# Patient Record
Sex: FEMALE | Race: WHITE | HISPANIC OR LATINO
[De-identification: names, ages, dates, MRNs, and addresses within clinical notes are randomized per-mention and may not be internally consistent; named-entity substitution may affect disease eponyms.]

---

## 2017-01-20 ENCOUNTER — LAB SERVICES (OUTPATIENT)
Dept: OTHER | Age: 37
End: 2017-01-20

## 2017-01-20 ENCOUNTER — CHARTING TRANS (OUTPATIENT)
Dept: OTHER | Age: 37
End: 2017-01-20

## 2017-01-20 LAB — RAPID STREP GROUP A: NORMAL

## 2017-04-25 ENCOUNTER — HOSPITAL ENCOUNTER (OUTPATIENT)
Age: 37
Discharge: HOME | End: 2017-04-25
Attending: FAMILY MEDICINE
Payer: COMMERCIAL

## 2017-04-25 VITALS
SYSTOLIC BLOOD PRESSURE: 142 MMHG | HEART RATE: 79 BPM | OXYGEN SATURATION: 98 % | RESPIRATION RATE: 18 BRPM | DIASTOLIC BLOOD PRESSURE: 97 MMHG | TEMPERATURE: 98 F

## 2017-04-25 DIAGNOSIS — R42 DIZZINESS: Primary | ICD-10-CM

## 2017-04-25 DIAGNOSIS — R11.0 NAUSEA: ICD-10-CM

## 2017-04-25 PROCEDURE — 99213 OFFICE O/P EST LOW 20 MIN: CPT

## 2017-04-25 PROCEDURE — 80076 HEPATIC FUNCTION PANEL: CPT | Performed by: FAMILY MEDICINE

## 2017-04-25 PROCEDURE — 85025 COMPLETE CBC W/AUTO DIFF WBC: CPT | Performed by: FAMILY MEDICINE

## 2017-04-25 PROCEDURE — 80047 BASIC METABLC PNL IONIZED CA: CPT

## 2017-04-25 PROCEDURE — 99214 OFFICE O/P EST MOD 30 MIN: CPT

## 2017-04-25 RX ORDER — MECLIZINE HYDROCHLORIDE 25 MG/1
25 TABLET ORAL 3 TIMES DAILY PRN
Qty: 21 TABLET | Refills: 0 | Status: SHIPPED | OUTPATIENT
Start: 2017-04-25 | End: 2017-07-31

## 2017-04-25 RX ORDER — ONDANSETRON 4 MG/1
TABLET, ORALLY DISINTEGRATING ORAL
Qty: 12 TABLET | Refills: 0 | Status: SHIPPED | OUTPATIENT
Start: 2017-04-25 | End: 2017-07-31

## 2017-04-25 RX ORDER — ONDANSETRON 4 MG/1
8 TABLET, ORALLY DISINTEGRATING ORAL ONCE
Status: COMPLETED | OUTPATIENT
Start: 2017-04-25 | End: 2017-04-25

## 2017-04-25 RX ORDER — MECLIZINE HCL 12.5 MG/1
25 TABLET ORAL ONCE
Status: COMPLETED | OUTPATIENT
Start: 2017-04-25 | End: 2017-04-25

## 2017-04-25 NOTE — ED PROVIDER NOTES
Patient Seen in: [facility] In [facility]    History   Patient presents with:  Nausea    Stated Complaint: nausea & dizzy    HPI    This 41-year-old female presents to the office with complaint of nausea and dizziness after she was cleaning u Oral Tab,  Take  by mouth. MethylPREDNISolone 4 MG Oral Tablet Therapy Pack,  Dispense as dose fauzia   Sertraline HCl (ZOLOFT) 50 MG Oral Tab,  Take 75 mg by mouth daily. 75 mg for 3 weeks  Then 100 mg daily for 1 week.        Family History   Problem Relat quadrants. EXTREMITIES: No clubbing, cyanosis, edema noted. SKIN: Warm and dry. No rash is noted. NEURO: No focal deficits.         ED Course     Labs Reviewed   POCT CBC - Abnormal; Notable for the following:     # Neutrophil 7.5 (*)     All other comp 4 mg ODT 1 every 6 hours as needed for nausea or vomiting. You may take Antivert 25 mg 1 tablet every 6 hours as needed for dizziness. Avoid any spicy, greasy, fatty foods. Eat small frequent meals throughout the day. Drinks 64 ounces of water daily.

## 2017-04-25 NOTE — ED INITIAL ASSESSMENT (HPI)
Picked up little tubes of paint on the floor and felt nauseated and also felt dizzy after. States has a metallic taste in the mouth and some shortness of breath. Both arms feels warm.

## 2017-04-25 NOTE — ED NOTES
Poison Control called. Gaurav's photo oil paint reviewed by Haydee Mclaughlin - No hazard found. No respiratory precautions recommended. Haydee Mclaughlin recommends observe pt, he will call back a bit later. Dr. Tana Canada notified.

## 2017-07-31 ENCOUNTER — HOSPITAL ENCOUNTER (OUTPATIENT)
Age: 37
Discharge: HOME | End: 2017-07-31
Payer: MEDICAID

## 2017-07-31 ENCOUNTER — APPOINTMENT (OUTPATIENT)
Dept: GENERAL RADIOLOGY | Age: 37
End: 2017-07-31
Attending: PHYSICIAN ASSISTANT
Payer: MEDICAID

## 2017-07-31 VITALS
BODY MASS INDEX: 44.39 KG/M2 | HEIGHT: 64 IN | DIASTOLIC BLOOD PRESSURE: 86 MMHG | OXYGEN SATURATION: 97 % | TEMPERATURE: 99 F | SYSTOLIC BLOOD PRESSURE: 124 MMHG | WEIGHT: 260 LBS | HEART RATE: 89 BPM | RESPIRATION RATE: 22 BRPM

## 2017-07-31 DIAGNOSIS — J40 BRONCHITIS: Primary | ICD-10-CM

## 2017-07-31 PROCEDURE — 94640 AIRWAY INHALATION TREATMENT: CPT

## 2017-07-31 PROCEDURE — 99214 OFFICE O/P EST MOD 30 MIN: CPT

## 2017-07-31 PROCEDURE — 71020 XR CHEST PA + LAT CHEST (CPT=71020): CPT | Performed by: PHYSICIAN ASSISTANT

## 2017-07-31 RX ORDER — BENZONATATE 100 MG/1
100 CAPSULE ORAL 3 TIMES DAILY PRN
Qty: 30 CAPSULE | Refills: 0 | Status: SHIPPED | OUTPATIENT
Start: 2017-07-31 | End: 2017-08-30

## 2017-07-31 RX ORDER — ALBUTEROL SULFATE 2.5 MG/3ML
2.5 SOLUTION RESPIRATORY (INHALATION) EVERY 4 HOURS PRN
Qty: 30 AMPULE | Refills: 0 | Status: SHIPPED | OUTPATIENT
Start: 2017-07-31 | End: 2017-08-30

## 2017-07-31 RX ORDER — METHYLPREDNISOLONE 4 MG/1
TABLET ORAL
Qty: 1 PACKAGE | Refills: 0 | Status: SHIPPED | OUTPATIENT
Start: 2017-07-31

## 2017-07-31 RX ORDER — IPRATROPIUM BROMIDE AND ALBUTEROL SULFATE 2.5; .5 MG/3ML; MG/3ML
3 SOLUTION RESPIRATORY (INHALATION) ONCE
Status: COMPLETED | OUTPATIENT
Start: 2017-07-31 | End: 2017-07-31

## 2017-07-31 RX ORDER — ALBUTEROL SULFATE 0.75 MG/3ML
SOLUTION RESPIRATORY (INHALATION) AS NEEDED
COMMUNITY

## 2017-07-31 NOTE — ED PROVIDER NOTES
Patient Seen in: [facility]    History   Patient presents with:  Dyspnea TIM SOB (respiratory)    Stated Complaint: URI    HPI    CHIEF COMPLAINT: Shortness of breath and cough     HISTORY OF PRESENT ILLNESS: Is a 68-year-old fe stress disorder)        Past Surgical History:  No date:       Comment: x4  No date: OTHER SURGICAL HISTORY      Comment: rectal sx    Medications :   albuterol sulfate 0.63 MG/3ML Inhalation Nebu Soln,  Take by nebulization as needed for Wheezing 22   Ht 162.6 cm (5' 4\")   Wt 117.9 kg   LMP 07/08/2017   SpO2 97%   BMI 44.63 kg/m²         Physical Exam    Vital signs and nursing notes reviewed  General Appearance: Patient is alert and oriented x4 in no acute distress  Eyes: pupils equal and round, 7/31/2017 at 13:28     Approved by: Ernestina Pedraza MD            ============================================================  ED Course  ------------------------------------------------------------  MDM   78-year-old female with cough and chest congestio

## 2018-11-05 VITALS
OXYGEN SATURATION: 99 % | TEMPERATURE: 98.1 F | WEIGHT: 267.99 LBS | HEART RATE: 90 BPM | DIASTOLIC BLOOD PRESSURE: 78 MMHG | SYSTOLIC BLOOD PRESSURE: 114 MMHG | BODY MASS INDEX: 44.65 KG/M2 | HEIGHT: 65 IN | RESPIRATION RATE: 18 BRPM

## 2019-03-06 ENCOUNTER — APPOINTMENT (OUTPATIENT)
Dept: GENERAL RADIOLOGY | Age: 39
End: 2019-03-06
Attending: FAMILY MEDICINE
Payer: COMMERCIAL

## 2019-03-06 ENCOUNTER — HOSPITAL ENCOUNTER (OUTPATIENT)
Age: 39
Discharge: HOME | End: 2019-03-06
Attending: FAMILY MEDICINE
Payer: COMMERCIAL

## 2019-03-06 VITALS
BODY MASS INDEX: 48 KG/M2 | SYSTOLIC BLOOD PRESSURE: 132 MMHG | RESPIRATION RATE: 20 BRPM | DIASTOLIC BLOOD PRESSURE: 69 MMHG | TEMPERATURE: 99 F | OXYGEN SATURATION: 96 % | WEIGHT: 280 LBS | HEART RATE: 103 BPM

## 2019-03-06 DIAGNOSIS — J45.909 MILD REACTIVE AIRWAYS DISEASE, UNSPECIFIED WHETHER PERSISTENT: ICD-10-CM

## 2019-03-06 DIAGNOSIS — J10.1 INFLUENZA A: Primary | ICD-10-CM

## 2019-03-06 LAB
POCT INFLUENZA A: POSITIVE
POCT INFLUENZA B: NEGATIVE

## 2019-03-06 PROCEDURE — 71046 X-RAY EXAM CHEST 2 VIEWS: CPT | Performed by: FAMILY MEDICINE

## 2019-03-06 PROCEDURE — 99214 OFFICE O/P EST MOD 30 MIN: CPT

## 2019-03-06 PROCEDURE — 94640 AIRWAY INHALATION TREATMENT: CPT

## 2019-03-06 PROCEDURE — 87502 INFLUENZA DNA AMP PROBE: CPT | Performed by: FAMILY MEDICINE

## 2019-03-06 RX ORDER — BENZONATATE 100 MG/1
100 CAPSULE ORAL 3 TIMES DAILY PRN
Qty: 15 CAPSULE | Refills: 0 | Status: SHIPPED | OUTPATIENT
Start: 2019-03-06 | End: 2019-03-11

## 2019-03-06 RX ORDER — ALBUTEROL SULFATE 2.5 MG/3ML
2.5 SOLUTION RESPIRATORY (INHALATION) EVERY 6 HOURS PRN
Qty: 30 AMPULE | Refills: 0 | Status: SHIPPED | OUTPATIENT
Start: 2019-03-06 | End: 2019-04-05

## 2019-03-06 RX ORDER — OSELTAMIVIR PHOSPHATE 75 MG/1
75 CAPSULE ORAL 2 TIMES DAILY
Qty: 10 CAPSULE | Refills: 0 | Status: SHIPPED | OUTPATIENT
Start: 2019-03-06 | End: 2019-03-11

## 2019-03-06 RX ORDER — IPRATROPIUM BROMIDE AND ALBUTEROL SULFATE 2.5; .5 MG/3ML; MG/3ML
3 SOLUTION RESPIRATORY (INHALATION) ONCE
Status: COMPLETED | OUTPATIENT
Start: 2019-03-06 | End: 2019-03-06

## 2019-03-06 NOTE — ED INITIAL ASSESSMENT (HPI)
States Monday night developed a cough , headache, and chills . Patient does also admit to intermittent dizziness. States yesterday she developed shortness of breath. Patient does have a PMH of pneumonia. States that she noticed she was wheezing last night.

## 2019-03-06 NOTE — ED PROVIDER NOTES
Patient Seen in: [facility] In [facility]    History   Patient presents with:  Cough/URI  Dyspnea TIM SOB (respiratory)    Stated Complaint: cough congestion short of breathe     HPI  44 yo F here with complaints of cough / congestion and miky conversation, answering appropriately   SKIN: Flushed skin+, No pallor, no erythema, no cyanosis, warm and dry  Eyes: wnl, normal conjunctiva, glossy   HEAD: Normocephalic, atraumatic  EENT: OP - wnl, moist, erythematous OP, TMs - middle ear effusion +, Na Dispense:  15 capsule          Refill:  0      albuterol sulfate (2.5 MG/3ML) 0.083% Inhalation Nebu Soln          Sig: Take 3 mL (2.5 mg total) by nebulization every 6 (six) hours as needed for Wheezing or Shortness of Breath.           Dispense:  30 ampul [id number]  [phone]    In 1 week  As needed        Medications Prescribed:  Discharge Medication List as of 3/6/2019  5:52 PM    START taking these medications    Oseltamivir Phosphate (TAMIFLU) 75 MG Oral Cap  Take 1 capsule (75 mg total) by mouth 2

## 2019-09-25 ENCOUNTER — WALK IN (OUTPATIENT)
Dept: URGENT CARE | Age: 39
End: 2019-09-25

## 2019-09-25 DIAGNOSIS — Z11.1 SCREENING-PULMONARY TB: Primary | ICD-10-CM

## 2019-09-25 PROCEDURE — 86580 TB INTRADERMAL TEST: CPT | Performed by: NURSE PRACTITIONER

## 2019-09-25 RX ORDER — LISDEXAMFETAMINE DIMESYLATE CAPSULES 20 MG/1
20 CAPSULE ORAL EVERY MORNING
COMMUNITY

## 2019-09-25 RX ORDER — LAMOTRIGINE 300 MG/1
300 TABLET, EXTENDED RELEASE ORAL AT BEDTIME
COMMUNITY

## 2019-09-27 ENCOUNTER — WALK IN (OUTPATIENT)
Dept: URGENT CARE | Age: 39
End: 2019-09-27

## 2019-09-27 DIAGNOSIS — Z11.1 ENCOUNTER FOR PPD SKIN TEST READING: Primary | ICD-10-CM

## 2019-09-27 LAB
INDURATION: NORMAL MM (ref 0–?)
SKIN TEST RESULT: NEGATIVE

## 2019-09-27 PROCEDURE — X1094 NO CHARGE VISIT: HCPCS | Performed by: NURSE PRACTITIONER

## 2021-08-25 ENCOUNTER — APPOINTMENT (OUTPATIENT)
Dept: CT IMAGING | Age: 41
End: 2021-08-25
Attending: PHYSICIAN ASSISTANT
Payer: COMMERCIAL

## 2021-08-25 ENCOUNTER — HOSPITAL ENCOUNTER (OUTPATIENT)
Age: 41
Discharge: HOME | End: 2021-08-25
Payer: COMMERCIAL

## 2021-08-25 VITALS
WEIGHT: 280 LBS | SYSTOLIC BLOOD PRESSURE: 121 MMHG | HEART RATE: 70 BPM | OXYGEN SATURATION: 99 % | HEIGHT: 65 IN | BODY MASS INDEX: 46.65 KG/M2 | DIASTOLIC BLOOD PRESSURE: 73 MMHG | RESPIRATION RATE: 16 BRPM | TEMPERATURE: 98 F

## 2021-08-25 DIAGNOSIS — R51.9 ACUTE INTRACTABLE HEADACHE, UNSPECIFIED HEADACHE TYPE: ICD-10-CM

## 2021-08-25 DIAGNOSIS — R19.7 NAUSEA VOMITING AND DIARRHEA: Primary | ICD-10-CM

## 2021-08-25 DIAGNOSIS — R11.2 NAUSEA VOMITING AND DIARRHEA: Primary | ICD-10-CM

## 2021-08-25 DIAGNOSIS — Z20.822 LAB TEST NEGATIVE FOR COVID-19 VIRUS: ICD-10-CM

## 2021-08-25 LAB
B-HCG UR QL: NEGATIVE
B-HCG UR QL: NEGATIVE
BASOPHILS # BLD AUTO: 0.04 X10(3) UL (ref 0–0.2)
BASOPHILS NFR BLD AUTO: 0.5 %
BUN BLD-MCNC: 11 MG/DL (ref 7–18)
CHLORIDE BLD-SCNC: 103 MMOL/L (ref 98–112)
CO2 BLD-SCNC: 29 MMOL/L (ref 21–32)
CREAT BLD-MCNC: 0.8 MG/DL
EOSINOPHIL # BLD AUTO: 0.03 X10(3) UL (ref 0–0.7)
EOSINOPHIL NFR BLD AUTO: 0.3 %
ERYTHROCYTE [DISTWIDTH] IN BLOOD BY AUTOMATED COUNT: 13.1 %
GLUCOSE BLD-MCNC: 95 MG/DL (ref 70–99)
HCT VFR BLD AUTO: 45.1 %
HCT VFR BLD AUTO: 45.3 %
HCT VFR BLD CALC: 45 %
HGB BLD-MCNC: 14.5 G/DL
HGB BLD-MCNC: 14.7 G/DL
IMM GRANULOCYTES # BLD AUTO: 0.07 X10(3) UL (ref 0–1)
IMM GRANULOCYTES NFR BLD: 0.8 %
ISTAT IONIZED CALCIUM FOR CHEM 8: 1.25 MMOL/L (ref 1.12–1.32)
LYMPHOCYTES # BLD AUTO: 2.01 X10(3) UL (ref 1–4)
LYMPHOCYTES NFR BLD AUTO: 23.2 %
MCH RBC QN AUTO: 30.1 PG (ref 26–34)
MCH RBC QN AUTO: 30.3 PG (ref 26–34)
MCHC RBC AUTO-ENTMCNC: 32 G/DL (ref 31–37)
MCHC RBC AUTO-ENTMCNC: 32.6 G/DL (ref 31–37)
MCV RBC AUTO: 92.2 FL
MCV RBC AUTO: 94.6 FL (ref 80–100)
MONOCYTES # BLD AUTO: 0.46 X10(3) UL (ref 0.1–1)
MONOCYTES NFR BLD AUTO: 5.3 %
NEUTROPHILS # BLD AUTO: 6.05 X10 (3) UL (ref 1.5–7.7)
NEUTROPHILS # BLD AUTO: 6.05 X10(3) UL (ref 1.5–7.7)
NEUTROPHILS NFR BLD AUTO: 69.9 %
PLATELET # BLD AUTO: 332 X10ˆ3/UL (ref 150–450)
PLATELET # BLD AUTO: 334 10(3)UL (ref 150–450)
POCT BILIRUBIN URINE: NEGATIVE
POCT GLUCOSE URINE: NEGATIVE MG/DL
POCT KETONE URINE: NEGATIVE MG/DL
POCT LEUKOCYTE ESTERASE URINE: NEGATIVE
POCT NITRITE URINE: NEGATIVE
POCT PH URINE: 6.5 (ref 5–8)
POCT PROTEIN URINE: NEGATIVE MG/DL
POCT SPECIFIC GRAVITY URINE: 1.02
POCT URINE CLARITY: CLEAR
POCT URINE COLOR: YELLOW
POCT UROBILINOGEN URINE: 0.2 MG/DL
POTASSIUM BLD-SCNC: 4.6 MMOL/L (ref 3.6–5.1)
RBC # BLD AUTO: 4.79 X10ˆ6/UL
RBC # BLD AUTO: 4.89 X10(6)UL
SARS-COV-2 RNA RESP QL NAA+PROBE: NOT DETECTED
SODIUM BLD-SCNC: 139 MMOL/L (ref 136–145)
WBC # BLD AUTO: 8.5 X10ˆ3/UL (ref 4–11)
WBC # BLD AUTO: 8.7 X10(3) UL (ref 4–11)

## 2021-08-25 PROCEDURE — 99214 OFFICE O/P EST MOD 30 MIN: CPT

## 2021-08-25 PROCEDURE — 70450 CT HEAD/BRAIN W/O DYE: CPT | Performed by: PHYSICIAN ASSISTANT

## 2021-08-25 PROCEDURE — 81002 URINALYSIS NONAUTO W/O SCOPE: CPT

## 2021-08-25 PROCEDURE — 81025 URINE PREGNANCY TEST: CPT

## 2021-08-25 PROCEDURE — 96361 HYDRATE IV INFUSION ADD-ON: CPT

## 2021-08-25 PROCEDURE — 85025 COMPLETE CBC W/AUTO DIFF WBC: CPT | Performed by: PHYSICIAN ASSISTANT

## 2021-08-25 PROCEDURE — 81002 URINALYSIS NONAUTO W/O SCOPE: CPT | Performed by: PHYSICIAN ASSISTANT

## 2021-08-25 PROCEDURE — 80047 BASIC METABLC PNL IONIZED CA: CPT

## 2021-08-25 PROCEDURE — 96375 TX/PRO/DX INJ NEW DRUG ADDON: CPT

## 2021-08-25 PROCEDURE — 96374 THER/PROPH/DIAG INJ IV PUSH: CPT

## 2021-08-25 PROCEDURE — 99215 OFFICE O/P EST HI 40 MIN: CPT

## 2021-08-25 RX ORDER — KETOROLAC TROMETHAMINE 30 MG/ML
15 INJECTION, SOLUTION INTRAMUSCULAR; INTRAVENOUS ONCE
Status: COMPLETED | OUTPATIENT
Start: 2021-08-25 | End: 2021-08-25

## 2021-08-25 RX ORDER — SODIUM CHLORIDE 9 MG/ML
1000 INJECTION, SOLUTION INTRAVENOUS ONCE
Status: COMPLETED | OUTPATIENT
Start: 2021-08-25 | End: 2021-08-25

## 2021-08-25 RX ORDER — ONDANSETRON 4 MG/1
4 TABLET, ORALLY DISINTEGRATING ORAL EVERY 4 HOURS PRN
Qty: 20 TABLET | Refills: 0 | Status: SHIPPED | OUTPATIENT
Start: 2021-08-25

## 2021-08-25 RX ORDER — ONDANSETRON 2 MG/ML
4 INJECTION INTRAMUSCULAR; INTRAVENOUS ONCE
Status: COMPLETED | OUTPATIENT
Start: 2021-08-25 | End: 2021-08-25

## 2021-08-25 RX ORDER — METOCLOPRAMIDE HYDROCHLORIDE 5 MG/ML
10 INJECTION INTRAMUSCULAR; INTRAVENOUS ONCE
Status: COMPLETED | OUTPATIENT
Start: 2021-08-25 | End: 2021-08-25

## 2021-08-25 NOTE — ED PROVIDER NOTES
Patient Seen in: [facility]      History   Patient presents with:  Nausea/Vomiting/Diarrhea    Stated Complaint: nausea / diarrhea / dizzy / headache x 2 days    HPI/Subjective:   HPI    14-year-old female here with complaint of nausea vo 0820]   /90   Pulse 80   Resp 17   Temp 97.6 °F (36.4 °C)   Temp src Temporal   SpO2 95 %   O2 Device None (Room air)       Current:/71   Pulse 68   Temp 97.6 °F (36.4 °C) (Temporal)   Resp 16   Ht 165.1 cm (5' 5\")   Wt 127 kg   LMP 07/21/2021 - Normal   RAPID SARS-COV-2 BY PCR - Normal   POCT CBC   CT BRAIN OR HEAD (57976)    Result Date: 8/25/2021  PROCEDURE:  CT BRAIN OR HEAD (77935)  COMPARISON:  None.   INDICATIONS:  nausea / diarrhea / dizzy / headache x 2 days  TECHNIQUE:  Noncontrast CT s discussed with the attending physician     The patient is encouraged to return if any concerning symptoms arise. Additional verbal discharge instructions are given and discussed. Discharge medications are discussed.  The patient is in good conditi

## 2022-09-04 ENCOUNTER — WALK IN (OUTPATIENT)
Dept: URGENT CARE | Age: 42
End: 2022-09-04

## 2022-09-04 VITALS
BODY MASS INDEX: 45.8 KG/M2 | DIASTOLIC BLOOD PRESSURE: 84 MMHG | WEIGHT: 285 LBS | HEART RATE: 84 BPM | SYSTOLIC BLOOD PRESSURE: 124 MMHG | RESPIRATION RATE: 18 BRPM | TEMPERATURE: 98.4 F | HEIGHT: 66 IN | OXYGEN SATURATION: 97 %

## 2022-09-04 DIAGNOSIS — Z86.16 HISTORY OF 2019 NOVEL CORONAVIRUS DISEASE (COVID-19): Primary | ICD-10-CM

## 2022-09-04 PROCEDURE — 99202 OFFICE O/P NEW SF 15 MIN: CPT | Performed by: NURSE PRACTITIONER

## 2022-09-04 ASSESSMENT — ENCOUNTER SYMPTOMS
SHORTNESS OF BREATH: 0
CONSTITUTIONAL NEGATIVE: 1
WHEEZING: 0
COUGH: 1

## 2023-01-19 ENCOUNTER — HOSPITAL ENCOUNTER (OUTPATIENT)
Age: 43
Discharge: HOME | End: 2023-01-19
Payer: COMMERCIAL

## 2023-01-19 VITALS
OXYGEN SATURATION: 96 % | RESPIRATION RATE: 20 BRPM | SYSTOLIC BLOOD PRESSURE: 151 MMHG | DIASTOLIC BLOOD PRESSURE: 90 MMHG | TEMPERATURE: 98 F | HEART RATE: 86 BPM

## 2023-01-19 DIAGNOSIS — M79.7 MUSCLE PAIN, FIBROMYALGIA: Primary | ICD-10-CM

## 2023-01-19 LAB — SARS-COV-2 RNA RESP QL NAA+PROBE: NOT DETECTED

## 2023-01-19 PROCEDURE — 99214 OFFICE O/P EST MOD 30 MIN: CPT

## 2023-01-19 PROCEDURE — 99213 OFFICE O/P EST LOW 20 MIN: CPT

## 2023-01-19 RX ORDER — TRAMADOL HYDROCHLORIDE 50 MG/1
50 TABLET ORAL EVERY 6 HOURS PRN
Qty: 15 TABLET | Refills: 0 | Status: SHIPPED | OUTPATIENT
Start: 2023-01-19 | End: 2023-01-23

## 2023-01-19 NOTE — DISCHARGE INSTRUCTIONS
Rest and drink plenty of fluids. Take ibuprofen as needed for pain. Use the Tramadol as needed for more severe pain. Follow up with Dr. Licha Neves in the next 1 week.
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
## 2023-04-20 ENCOUNTER — HOSPITAL ENCOUNTER (OUTPATIENT)
Age: 43
Discharge: HOME | End: 2023-04-20
Attending: STUDENT IN AN ORGANIZED HEALTH CARE EDUCATION/TRAINING PROGRAM
Payer: COMMERCIAL

## 2023-04-20 VITALS
DIASTOLIC BLOOD PRESSURE: 79 MMHG | OXYGEN SATURATION: 98 % | TEMPERATURE: 97 F | RESPIRATION RATE: 20 BRPM | SYSTOLIC BLOOD PRESSURE: 123 MMHG | HEART RATE: 95 BPM

## 2023-04-20 DIAGNOSIS — J40 BRONCHITIS: Primary | ICD-10-CM

## 2023-04-20 DIAGNOSIS — J06.9 VIRAL URI WITH COUGH: ICD-10-CM

## 2023-04-20 LAB — SARS-COV-2 RNA RESP QL NAA+PROBE: NOT DETECTED

## 2023-04-20 PROCEDURE — 99213 OFFICE O/P EST LOW 20 MIN: CPT

## 2023-04-20 PROCEDURE — 99214 OFFICE O/P EST MOD 30 MIN: CPT

## 2023-04-20 RX ORDER — PREDNISONE 20 MG/1
40 TABLET ORAL ONCE
Status: COMPLETED | OUTPATIENT
Start: 2023-04-20 | End: 2023-04-20

## 2023-04-20 RX ORDER — ALBUTEROL SULFATE 90 UG/1
2 AEROSOL, METERED RESPIRATORY (INHALATION) EVERY 4 HOURS PRN
Qty: 1 EACH | Refills: 0 | Status: SHIPPED | OUTPATIENT
Start: 2023-04-20 | End: 2023-05-20

## 2023-04-20 RX ORDER — CETIRIZINE HYDROCHLORIDE 10 MG/1
10 TABLET ORAL DAILY
Qty: 30 TABLET | Refills: 0 | Status: SHIPPED | OUTPATIENT
Start: 2023-04-20 | End: 2023-05-20

## 2023-04-20 RX ORDER — PREDNISONE 20 MG/1
40 TABLET ORAL DAILY
Qty: 10 TABLET | Refills: 0 | Status: SHIPPED | OUTPATIENT
Start: 2023-04-20 | End: 2023-04-25

## 2023-04-20 NOTE — ED INITIAL ASSESSMENT (HPI)
Patient presents to IC with c/o right earpain and sinus pain and congestion x 2 days. +postnasal drip. No fever noted.

## 2023-09-07 ENCOUNTER — OCC HEALTH (OUTPATIENT)
Dept: OCCUPATIONAL MEDICINE | Age: 43
End: 2023-09-07
Attending: PHYSICIAN ASSISTANT

## 2023-09-07 ENCOUNTER — HOSPITAL ENCOUNTER (OUTPATIENT)
Dept: GENERAL RADIOLOGY | Age: 43
Discharge: HOME | End: 2023-09-07
Attending: PHYSICIAN ASSISTANT

## 2023-09-07 DIAGNOSIS — S80.02XA CONTUSION OF LEFT KNEE, INITIAL ENCOUNTER: ICD-10-CM

## 2023-09-07 DIAGNOSIS — S93.402A LEFT ANKLE SPRAIN: ICD-10-CM

## 2023-09-07 DIAGNOSIS — S80.02XA CONTUSION OF LEFT KNEE, INITIAL ENCOUNTER: Primary | ICD-10-CM

## 2023-09-07 PROCEDURE — 73564 X-RAY EXAM KNEE 4 OR MORE: CPT | Performed by: PHYSICIAN ASSISTANT

## 2023-09-07 PROCEDURE — 73610 X-RAY EXAM OF ANKLE: CPT | Performed by: PHYSICIAN ASSISTANT

## 2023-09-11 ENCOUNTER — TELEPHONE (OUTPATIENT)
Dept: ORTHOPEDICS CLINIC | Facility: CLINIC | Age: 43
End: 2023-09-11

## 2023-09-11 NOTE — TELEPHONE ENCOUNTER
Future Appointments   Date Time Provider Alexandre Latasha   9/13/2023 10:30 AM Donna LOWERY DPM [facility]       Patient setup appt but she does not have her W/C info, mentioned that she needs those info on the day off so it is set for self-pay as of now.

## 2023-09-13 ENCOUNTER — OFFICE VISIT (OUTPATIENT)
Dept: ORTHOPEDICS CLINIC | Facility: CLINIC | Age: 43
End: 2023-09-13
Payer: COMMERCIAL

## 2023-09-13 VITALS — HEIGHT: 65 IN | BODY MASS INDEX: 46.65 KG/M2 | WEIGHT: 280 LBS

## 2023-09-13 DIAGNOSIS — M25.373 ANKLE INSTABILITY, UNSPECIFIED LATERALITY: ICD-10-CM

## 2023-09-13 DIAGNOSIS — T14.8XXA AVULSION FRACTURE: Primary | ICD-10-CM

## 2023-09-13 PROCEDURE — 99204 OFFICE O/P NEW MOD 45 MIN: CPT | Performed by: PODIATRIST

## 2023-09-21 ENCOUNTER — TELEPHONE (OUTPATIENT)
Dept: PHYSICAL THERAPY | Facility: HOSPITAL | Age: 43
End: 2023-09-21

## 2023-09-25 ENCOUNTER — APPOINTMENT (OUTPATIENT)
Dept: PHYSICAL THERAPY | Age: 43
End: 2023-09-25
Attending: PODIATRIST
Payer: OTHER MISCELLANEOUS

## 2023-09-25 ENCOUNTER — TELEPHONE (OUTPATIENT)
Dept: PHYSICAL THERAPY | Age: 43
End: 2023-09-25

## 2023-09-25 ENCOUNTER — TELEPHONE (OUTPATIENT)
Dept: PHYSICAL THERAPY | Facility: HOSPITAL | Age: 43
End: 2023-09-25

## 2023-10-03 ENCOUNTER — TELEPHONE (OUTPATIENT)
Dept: PHYSICAL THERAPY | Facility: HOSPITAL | Age: 43
End: 2023-10-03

## 2023-10-03 ENCOUNTER — APPOINTMENT (OUTPATIENT)
Dept: PHYSICAL THERAPY | Age: 43
End: 2023-10-03
Attending: PODIATRIST
Payer: OTHER MISCELLANEOUS

## 2023-10-04 ENCOUNTER — OFFICE VISIT (OUTPATIENT)
Dept: ORTHOPEDICS CLINIC | Facility: CLINIC | Age: 43
End: 2023-10-04
Payer: OTHER MISCELLANEOUS

## 2023-10-04 DIAGNOSIS — M25.373 ANKLE INSTABILITY, UNSPECIFIED LATERALITY: ICD-10-CM

## 2023-10-04 DIAGNOSIS — T14.8XXA AVULSION FRACTURE: Primary | ICD-10-CM

## 2023-10-04 PROCEDURE — 99214 OFFICE O/P EST MOD 30 MIN: CPT | Performed by: PODIATRIST

## 2023-10-04 NOTE — PROGRESS NOTES
[facility] Progress Note    Subjective:   Sybil Dominguez is here for follow up  Has been in boot and 1/2 time using ankle spllint  Injury 1 month ago  Was trying to go out of boot and had a flare of ankle pain  Has not been in PT yet because of authorization issues and Workmen's Comp. Objective:     Exam left ankle he continues to have pain about the left ankle at the distal fibula and especially medial malleolar region. Mild swelling. Ankle is stable. Imaging: X-rays reviewed        Assessment/Plan:     Diagnoses and all orders for this visit:    Avulsion fracture    Ankle instability, unspecified laterality        Because of the continued pain and swelling we are going to proceed with an MRI of the left ankle. A small avulsion fragment off the medial malleolus should feel better by now especially in the boot. She has not been able to get into physical therapy which is unfortunate, and when she can start we need to proceed with that. If MRI is negative she can start to consider going back to work but it would be helpful to go through some physical therapy. We will let her know the results of the MRI which is going to be through [place] [id number]. looking for any internal derangement of the ankle, ligamentous damage, OCD fragment or bone edema in addition to the small ossicle off the medial malleolus. She continues to have instability            Fortunato Mascorro. Mitzi Neely DPM  [facility]    [employer] speech recognition software was used to prepare this note. If a word or phrase is confusing, it is likely do to a failure of recognition. Please contact me with any questions or clarifications.

## 2023-10-05 ENCOUNTER — APPOINTMENT (OUTPATIENT)
Dept: PHYSICAL THERAPY | Age: 43
End: 2023-10-05
Attending: PODIATRIST
Payer: OTHER MISCELLANEOUS

## 2023-10-06 ENCOUNTER — APPOINTMENT (OUTPATIENT)
Dept: GENERAL RADIOLOGY | Age: 43
End: 2023-10-06
Attending: PHYSICIAN ASSISTANT

## 2023-10-06 ENCOUNTER — HOSPITAL ENCOUNTER (OUTPATIENT)
Age: 43
Discharge: HOME | End: 2023-10-06

## 2023-10-06 VITALS
HEART RATE: 79 BPM | BODY MASS INDEX: 48.32 KG/M2 | TEMPERATURE: 99 F | RESPIRATION RATE: 16 BRPM | HEIGHT: 65 IN | SYSTOLIC BLOOD PRESSURE: 137 MMHG | OXYGEN SATURATION: 96 % | WEIGHT: 290 LBS | DIASTOLIC BLOOD PRESSURE: 93 MMHG

## 2023-10-06 DIAGNOSIS — J45.901 EXACERBATION OF ASTHMA, UNSPECIFIED ASTHMA SEVERITY, UNSPECIFIED WHETHER PERSISTENT: Primary | ICD-10-CM

## 2023-10-06 LAB — SARS-COV-2 RNA RESP QL NAA+PROBE: NOT DETECTED

## 2023-10-06 PROCEDURE — 99214 OFFICE O/P EST MOD 30 MIN: CPT

## 2023-10-06 PROCEDURE — 71046 X-RAY EXAM CHEST 2 VIEWS: CPT | Performed by: PHYSICIAN ASSISTANT

## 2023-10-06 PROCEDURE — 94640 AIRWAY INHALATION TREATMENT: CPT

## 2023-10-06 RX ORDER — CODEINE PHOSPHATE AND GUAIFENESIN 10; 100 MG/5ML; MG/5ML
5 SOLUTION ORAL EVERY 6 HOURS PRN
Qty: 180 ML | Refills: 0 | Status: SHIPPED | OUTPATIENT
Start: 2023-10-06

## 2023-10-06 RX ORDER — IPRATROPIUM BROMIDE AND ALBUTEROL SULFATE 2.5; .5 MG/3ML; MG/3ML
3 SOLUTION RESPIRATORY (INHALATION) ONCE
Status: COMPLETED | OUTPATIENT
Start: 2023-10-06 | End: 2023-10-06

## 2023-10-06 RX ORDER — ALBUTEROL SULFATE 2.5 MG/3ML
2.5 SOLUTION RESPIRATORY (INHALATION) EVERY 4 HOURS PRN
Qty: 30 EACH | Refills: 0 | Status: SHIPPED | OUTPATIENT
Start: 2023-10-06 | End: 2023-11-05

## 2023-10-06 RX ORDER — DEXAMETHASONE 4 MG/1
8 TABLET ORAL ONCE
Status: COMPLETED | OUTPATIENT
Start: 2023-10-06 | End: 2023-10-06

## 2023-10-06 RX ORDER — ALBUTEROL SULFATE 90 UG/1
2 AEROSOL, METERED RESPIRATORY (INHALATION) EVERY 4 HOURS PRN
Qty: 1 EACH | Refills: 0 | Status: SHIPPED | OUTPATIENT
Start: 2023-10-06 | End: 2023-11-05

## 2023-10-06 RX ORDER — OXYMETAZOLINE HYDROCHLORIDE 0.05 G/100ML
1 SPRAY NASAL EVERY 4 HOURS PRN
Qty: 15 ML | Refills: 0 | Status: SHIPPED | OUTPATIENT
Start: 2023-10-06 | End: 2023-11-05

## 2023-10-06 RX ORDER — DEXAMETHASONE 4 MG/1
8 TABLET ORAL
Qty: 4 TABLET | Refills: 0 | Status: SHIPPED | OUTPATIENT
Start: 2023-10-06 | End: 2023-10-10

## 2023-10-06 RX ORDER — DEXAMETHASONE SODIUM PHOSPHATE 4 MG/ML
8 VIAL (ML) INJECTION ONCE
Status: DISCONTINUED | OUTPATIENT
Start: 2023-10-06 | End: 2023-10-06

## 2023-10-06 RX ORDER — PSEUDOEPHEDRINE HCL 30 MG
30 TABLET ORAL EVERY 4 HOURS PRN
Qty: 36 TABLET | Refills: 0 | Status: SHIPPED | OUTPATIENT
Start: 2023-10-06 | End: 2023-11-05

## 2023-10-06 RX ORDER — BENZONATATE 100 MG/1
100 CAPSULE ORAL 3 TIMES DAILY PRN
Qty: 30 CAPSULE | Refills: 0 | Status: SHIPPED | OUTPATIENT
Start: 2023-10-06 | End: 2023-11-05

## 2023-10-06 NOTE — ED INITIAL ASSESSMENT (HPI)
Seen by pmd 9/25 - given alb inhaler and prednisone x 5 days, diagnosed with bronchitis; cough persisting, sob, using inhaler at least 3 x /day    No fever

## 2023-10-16 ENCOUNTER — APPOINTMENT (OUTPATIENT)
Dept: PHYSICAL THERAPY | Age: 43
End: 2023-10-16
Attending: PODIATRIST
Payer: OTHER MISCELLANEOUS

## 2023-10-17 ENCOUNTER — TELEPHONE (OUTPATIENT)
Dept: PHYSICAL THERAPY | Facility: HOSPITAL | Age: 43
End: 2023-10-17

## 2023-10-17 ENCOUNTER — OFFICE VISIT (OUTPATIENT)
Dept: PHYSICAL THERAPY | Age: 43
End: 2023-10-17
Attending: PODIATRIST
Payer: OTHER MISCELLANEOUS

## 2023-10-17 DIAGNOSIS — M25.373 ANKLE INSTABILITY, UNSPECIFIED LATERALITY: ICD-10-CM

## 2023-10-17 DIAGNOSIS — T14.8XXA AVULSION FRACTURE: Primary | ICD-10-CM

## 2023-10-17 PROCEDURE — 97161 PT EVAL LOW COMPLEX 20 MIN: CPT | Performed by: PHYSICAL THERAPIST

## 2023-10-17 PROCEDURE — 97110 THERAPEUTIC EXERCISES: CPT | Performed by: PHYSICAL THERAPIST

## 2023-10-17 PROCEDURE — 97140 MANUAL THERAPY 1/> REGIONS: CPT | Performed by: PHYSICAL THERAPIST

## 2023-10-19 ENCOUNTER — TELEPHONE (OUTPATIENT)
Dept: ORTHOPEDICS CLINIC | Facility: CLINIC | Age: 43
End: 2023-10-19

## 2023-10-19 DIAGNOSIS — T14.8XXA AVULSION FRACTURE: Primary | ICD-10-CM

## 2023-10-19 DIAGNOSIS — M25.373 ANKLE INSTABILITY, UNSPECIFIED LATERALITY: ICD-10-CM

## 2023-10-19 NOTE — TELEPHONE ENCOUNTER
Wrong laterality was ordered. Reviewed notes. [facility] MRI ankle order should be for left. Reordered as written order (see notes.)    Dr Bradley Shed note: \"Because of the continued pain and swelling we are going to proceed with an MRI of the left ankle.  \"

## 2023-10-19 NOTE — TELEPHONE ENCOUNTER
Detail Level: [facility] Patients WC  Hossein Watkins called to request patient MRI order be updated to LT ankle instead of RT ankle currently in chart. Patient has appt at [facility] on 10/23, please be advised. Recommendations (Free Text): Follow up with pcp Recommendation Preamble: The following recommendations were made during the visit:

## 2023-10-20 ENCOUNTER — APPOINTMENT (OUTPATIENT)
Dept: PHYSICAL THERAPY | Age: 43
End: 2023-10-20
Attending: PODIATRIST
Payer: OTHER MISCELLANEOUS

## 2023-10-23 ENCOUNTER — OFFICE VISIT (OUTPATIENT)
Dept: PHYSICAL THERAPY | Age: 43
End: 2023-10-23
Attending: PODIATRIST
Payer: OTHER MISCELLANEOUS

## 2023-10-23 PROCEDURE — 97110 THERAPEUTIC EXERCISES: CPT | Performed by: PHYSICAL THERAPIST

## 2023-10-23 PROCEDURE — 97140 MANUAL THERAPY 1/> REGIONS: CPT | Performed by: PHYSICAL THERAPIST

## 2023-10-23 NOTE — PROGRESS NOTES
Diagnosis:   Avulsion fracture (T14.8XXA)  Ankle instability, unspecified laterality (T19.351)         Referring Provider: Delores Mcgill  Date of Evaluation:    10/17/23    Precautions:  None Next MD visit:   none scheduled  Date of Surgery: n/a   Insurance Primary/Secondary: WORKERS COMP / N/A     # Auth Visits: 8 visits            Subjective: pt reports her ankle was hurting a lot yesterday an 8/10 due to increased activities with walking and standing. Pain: 2/10      Objective:     AROM: (* denotes performed with pain)  Knee Foot/Ankle   Flexion: R WNL; L WNL  Extension: R WNL; L WNL    DF: R 10; L 8*  PF: R 65; L 65  INV: R 45; L 40*  EV: R 18; L 15*  Great toe ext: R WNL; L WNL         Balance: Deferred due to reduced ability to weight bear     Assessment: Pt presenting in CAM boot. She demonstrates a mild antalgic gait pattern with out the boot donned for short distances. She is progressed with non weight bearing strengthening and mobility exercises. She reports increase in pain but with in tolerance.        Goals:   (to be met in 12 visits)  Pt will demonstrate improved DF AROM to >= 10 degrees to promote proper foot clearance during gait and greater ease descending stairs without compensation  Pt will have increased ankle strength to 5/5 throughout for improved ankle control with ADLs such as prolonged gait and stair negotiation   Pt will have improved SLS to >10 sec on airex for increased ankle stability with ambulation on uneven surfaces such as gravel and grass   Pt will report <2/10 pain with work activities like squatting, stooping and floor to stand transfers   Pt will demonstrate ability to lift and carry 40 lbs x 20 feet to demonstrate readiness to return to full duty at work  Pt will be independent and compliant with comprehensive HEP to maintain progress achieved in PT     Plan: Progress to light weight bearing exercises as tolerated  Date: 10/23/2023  TX#: 2/8 Date:                 TX#: 3/ Date: TX#: 4/ Date:                 TX#: 5/ Date: Tx#: 6/   Manual tech: 15 min  -TC posterior glide level, II-III,   -PROM (L) ankle in all directions to tolerance  -manual gastroc stretching   -STM to gastroc/soleus and posterior tibialis       TherEx: 30 min  -Nustep: 5 min, level I  -windshield wipers: x2 min  -seated toe/heel raise: x30  -marble : x 1 set  -towel scrunches: 2 min  -resisted ankle DF, inv, ezio: YTB, 2x10    Next Tx consider:  BAPS board in sitting  Standing tandem balance  Standing marching  Step ups                       HEP:   Access Code: [email or web address]  URL: [email or web address]  Date: 10/17/2023  Prepared by: Juan Paulson     Exercises  - Seated Ankle Pumps on Table  - 1 x daily - 7 x weekly - 3 sets - 10 reps  - Seated Ankle Inversion Eversion AROM  - 1 x daily - 7 x weekly - 3 sets - 10 reps  - Seated Ankle Circles  - 1 x daily - 7 x weekly - 3 sets - 10 reps  - Seated Ankle Alphabet  - 1 x daily - 7 x weekly - 3 sets - 10 reps  - Seated Heel Raise  - 1 x daily - 7 x weekly - 3 sets - 10 reps  - Seated Ankle Dorsiflexion AROM  - 1 x daily - 7 x weekly - 3 sets - 10 reps    Charges: Manual tech: 1 unit, TherEx: 2 units         Total Timed Treatment: 45 min  Total Treatment Time: 45 min

## 2023-10-25 ENCOUNTER — TELEPHONE (OUTPATIENT)
Dept: ORTHOPEDICS CLINIC | Facility: CLINIC | Age: 43
End: 2023-10-25

## 2023-10-25 NOTE — TELEPHONE ENCOUNTER
Patient called to request a note for work clarifying that patient will need to continue medical leave or work with restrictions. Patient states w/c is requesting clarification from last note as her job is \"labor extensive\". Please advise, thank you. Patient can be reached at [phone].

## 2023-10-27 NOTE — TELEPHONE ENCOUNTER
My chart message from pt who is asking for a more detailed work excuse letter. Letter pended, please revise/review. Thank you! [facility] Clinical Pool (supporting Araceli Kim DPM)16 hours ago (5:23 PM)     Hollie Mccray, I wanted to let you know prior to the MRI  I had two visits with PT. After the second visit my pain increased the day after and I now have intermittent pain radiating towards my big toe. I received your notes for the MRI and the RN from the workman's comp read the findings also. She is suggesting that a more specific note be written with regards to my work status on whether or not I can go back or if there would be restrictions. 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 With twisting, lifting, and blocking being some of my duties. While I would like to go back, I don't think I can, and I'm not sure the would have a sitting only position available for me. Let me know your thoughts and again I would need a letter for my employer.  Thank you for your time,  it's very much appreciated, Roxane Forde

## 2023-10-31 ENCOUNTER — OFFICE VISIT (OUTPATIENT)
Dept: PHYSICAL THERAPY | Age: 43
End: 2023-10-31
Attending: PODIATRIST

## 2023-10-31 ENCOUNTER — TELEPHONE (OUTPATIENT)
Dept: PHYSICAL THERAPY | Facility: HOSPITAL | Age: 43
End: 2023-10-31

## 2023-10-31 PROCEDURE — 97110 THERAPEUTIC EXERCISES: CPT | Performed by: PHYSICAL THERAPIST

## 2023-11-01 ENCOUNTER — PATIENT MESSAGE (OUTPATIENT)
Dept: ORTHOPEDICS CLINIC | Facility: CLINIC | Age: 43
End: 2023-11-01

## 2023-11-01 NOTE — TELEPHONE ENCOUNTER
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 I know the back and forth and not being clear caused some frustration, but I thank you for your patience. I am on visit 3 of 8 for PT with my last visit being November 22nd. Would you like to do a follow up appointment before or after my last PT session?   Thank you for your time,   Ela Clark

## 2023-11-02 ENCOUNTER — TELEPHONE (OUTPATIENT)
Dept: PHYSICAL THERAPY | Facility: HOSPITAL | Age: 43
End: 2023-11-02

## 2023-11-02 NOTE — PROGRESS NOTES
Diagnosis:   Avulsion fracture (T14.8XXA)  Ankle instability, unspecified laterality (T62.393)         Referring Provider: Zaira Sadler  Date of Evaluation:    10/17/23    Precautions:  None Next MD visit:   none scheduled  Date of Surgery: n/a   Insurance Primary/Secondary: WORKERS COMP / N/A     # Auth Visits: 8 visits            Subjective: Pt reports that all motions in end range are painful, and that twisting motions also cause sharp pain. She mentions that end of the day is the worst, and that her pain is perpetually there. Pain: 2/10 at rest, up to 7-8/10 with activities      Objective:       AROM: (* denotes performed with pain)  Knee Foot/Ankle   Flexion: R WNL; L WNL  Extension: R WNL; L WNL    DF: R 10; L 8*  PF: R 65; L 65  INV: R 45; L 40*  EV: R 18; L 15*  Great toe ext: R WNL; L WNL         Balance: Deferred due to reduced ability to weight bear     Assessment:Patient continues to have difficulties w/ weight bearing movements and high levels of pain in any direction for her L ankle. Was progressed with mild increase in resistances for LE strengthening.        Goals:   (to be met in 12 visits)  Pt will demonstrate improved DF AROM to >= 10 degrees to promote proper foot clearance during gait and greater ease descending stairs without compensation  Pt will have increased ankle strength to 5/5 throughout for improved ankle control with ADLs such as prolonged gait and stair negotiation   Pt will have improved SLS to >10 sec on airex for increased ankle stability with ambulation on uneven surfaces such as gravel and grass   Pt will report <2/10 pain with work activities like squatting, stooping and floor to stand transfers   Pt will demonstrate ability to lift and carry 40 lbs x 20 feet to demonstrate readiness to return to full duty at work  Pt will be independent and compliant with comprehensive HEP to maintain progress achieved in PT     Plan: Progress to light weight bearing exercises as tolerated  Date: 10/23/2023  TX#: 2/8 Date: 10/31/23                TX#: 3/8 Date:  11/3/23               TX#: 4/8 Date:                 TX#: 5/ Date: Tx#: 6/   Manual tech: 15 min  -TC posterior glide level, II-III,   -PROM (L) ankle in all directions to tolerance  -manual gastroc stretching   -STM to gastroc/soleus and posterior tibialis       TherEx: 30 min  -Nustep: 5 min, level I  -windshield wipers: x2 min  -seated toe/heel raise: x30  -marble : x 1 set  -towel scrunches: 2 min  -resisted ankle DF, inv, ezio: YTB, 2x10    Next Tx consider:  BAPS board in sitting  Standing tandem balance  Standing marching  Step ups TherEx: 40 min  -Nustep: 5 min, level I  -windshield wipers: x2 min  -seated toe/heel raise: x30  -marble : x 1 set  -towel scrunches: 2 min  -resisted ankle DF, inv, ezio: YTB, 2x10-hold  -toe yoga: 3 min  -BAPS board in sitting: a/p, m/l x20 each  -s/l hip abd: 2x10  -SLR: 2x10  -clams: 2x10          Hol-toe flexion resisted: BTB, 2x10-holdd  -YSB bridges: 2x10  Standing tandem balance  Standing marching  Step ups TherEx: 45 min  -Nustep: 5 min, level I   -resisted ankle DF, inv, ezio: YTB, 2 x 15-20  Bridge w/ resistance 2 x 15   Clams w/ resistance 2 x 15  S/L hip abd 2 x 15  (Focus on non WB for now due to increased pain)          Next: Standing tandem balance  Standing marching  Step ups                   HEP:   Access Code: [email or web address]  URL: [email or web address]  Date: 10/17/2023  Prepared by: William Joyce     Exercises  - Seated Ankle Pumps on Table  - 1 x daily - 7 x weekly - 3 sets - 10 reps  - Seated Ankle Inversion Eversion AROM  - 1 x daily - 7 x weekly - 3 sets - 10 reps  - Seated Ankle Circles  - 1 x daily - 7 x weekly - 3 sets - 10 reps  - Seated Ankle Alphabet  - 1 x daily - 7 x weekly - 3 sets - 10 reps  - Seated Heel Raise  - 1 x daily - 7 x weekly - 3 sets - 10 reps  - Seated Ankle Dorsiflexion AROM  - 1 x daily - 7 x weekly - 3 sets - 10 reps    Charges:  TherEx: 3 units Total Timed Treatment: 40 min  Total Treatment Time: 40 min

## 2023-11-03 ENCOUNTER — OFFICE VISIT (OUTPATIENT)
Dept: PHYSICAL THERAPY | Age: 43
End: 2023-11-03
Attending: PODIATRIST
Payer: OTHER MISCELLANEOUS

## 2023-11-03 PROCEDURE — 97110 THERAPEUTIC EXERCISES: CPT

## 2023-11-07 ENCOUNTER — OFFICE VISIT (OUTPATIENT)
Dept: PHYSICAL THERAPY | Age: 43
End: 2023-11-07
Attending: PODIATRIST
Payer: OTHER MISCELLANEOUS

## 2023-11-07 PROCEDURE — 97016 VASOPNEUMATIC DEVICE THERAPY: CPT | Performed by: PHYSICAL THERAPIST

## 2023-11-07 PROCEDURE — 97110 THERAPEUTIC EXERCISES: CPT | Performed by: PHYSICAL THERAPIST

## 2023-11-07 PROCEDURE — 97140 MANUAL THERAPY 1/> REGIONS: CPT | Performed by: PHYSICAL THERAPIST

## 2023-11-07 NOTE — PROGRESS NOTES
Diagnosis:   Avulsion fracture (T14.8XXA)  Ankle instability, unspecified laterality (P17.630)         Referring Provider: Tamiko Castillo  Date of Evaluation:    10/17/23    Precautions:  None Next MD visit:   none scheduled  Date of Surgery: n/a   Insurance Primary/Secondary: WORKERS COMP / N/A     # Auth Visits: 8 visits            Subjective: Pt reports Sunday and Monday she was driving, more walking then normal, more standing then normal to cook and clean while wearing the boot and Monday morning she felt \"burning pain\" on the inside of the ankle and medial shin. Today it is not as bad. Pain is tolerable but it is not present. See always has pain. Pain: 3/10 at rest, up to 7/10 with activities most days      Objective:       AROM: (* denotes performed with pain)  Foot/Ankle   DF:  L 3*  IN: 35*  EV: 20*       TTP of (L) medial malleolus, posterior tib, distal soleus and gastric Medial >lateral      Assessment:Patient presenting with reduced ankle DF due to reports of pain and presents with TTP of calf muscles medial>lateral. Her ankle eversion slightly improves. She continues to be limited in weight bearing and ankle strengthening due to pain. Performed isometrics of the ankle to promote strength w/o aggravating her pain. She demonstrates early onset of fatigue and muscle fasciculations.       Goals:   (to be met in 12 visits)  Pt will demonstrate improved DF AROM to >= 10 degrees to promote proper foot clearance during gait and greater ease descending stairs without compensation  Pt will have increased ankle strength to 5/5 throughout for improved ankle control with ADLs such as prolonged gait and stair negotiation   Pt will have improved SLS to >10 sec on airex for increased ankle stability with ambulation on uneven surfaces such as gravel and grass   Pt will report <2/10 pain with work activities like squatting, stooping and floor to stand transfers   Pt will demonstrate ability to lift and carry 40 lbs x 20 feet to demonstrate readiness to return to full duty at work  Pt will be independent and compliant with comprehensive HEP to maintain progress achieved in PT     Plan: Progress to light weight bearing exercises as tolerated  Date: 10/23/2023  TX#: 2/8 Date: 10/31/23                TX#: 3/8 Date:  11/3/23               TX#: 4/8 Date: 11/7/23                TX#: 5/8 Date:    Tx#: 6/   Manual tech: 15 min  -TC posterior glide level, II-III,   -PROM (L) ankle in all directions to tolerance  -manual gastroc stretching   -STM to gastroc/soleus and posterior tibialis   Manual tech: 15 min   -PROM (L) ankle in all directions to tolerance  -manual gastroc stretching   -STM to gastroc/soleus and posterior tibialis    TherEx: 30 min  -Nustep: 5 min, level I  -windshield wipers: x2 min  -seated toe/heel raise: x30  -marble : x 1 set  -towel scrunches: 2 min  -resisted ankle DF, inv, ezio: YTB, 2x10    Next Tx consider:  BAPS board in sitting  Standing tandem balance  Standing marching  Step ups TherEx: 40 min  -Nustep: 5 min, level I  -windshield wipers: x2 min  -seated toe/heel raise: x30  -marble : x 1 set  -towel scrunches: 2 min  -resisted ankle DF, inv, ezio: YTB, 2x10-hold  -toe yoga: 3 min  -BAPS board in sitting: a/p, m/l x20 each  -s/l hip abd: 2x10  -SLR: 2x10  -clams: 2x10          Hol-toe flexion resisted: BTB, 2x10-holdd  -YSB bridges: 2x10  Standing tandem balance  Standing marching  Step ups TherEx: 45 min  -Nustep: 5 min, level I   -resisted ankle DF, inv, ezio: YTB, 2 x 15-20  Bridge w/ resistance 2 x 15   Clams w/ resistance 2 x 15  S/L hip abd 2 x 15  (Focus on non WB for now due to increased pain)          Next: Standing tandem balance  Standing marching  Step ups TherEx: 35 min  -SciFit: 5 min, level I   -resisted ankle isometrics: DF, inv, ezio: 10 sec x5 reps  -Bridge: 3x10  Clams w/ resistance: RTB,  3x10  S/L hip abd: 3x10  -SLR: 3x10        Next: Standing tandem balance  Standing marching  Step ups Modality: Vasopneumatic device: (L) ankle x10 min           HEP:   Access Code: [email or web address]  URL: [email or web address]  Date: 10/17/2023  Prepared by: Sylvie Miles     Exercises  - Seated Ankle Pumps on Table  - 1 x daily - 7 x weekly - 3 sets - 10 reps  - Seated Ankle Inversion Eversion AROM  - 1 x daily - 7 x weekly - 3 sets - 10 reps  - Seated Ankle Circles  - 1 x daily - 7 x weekly - 3 sets - 10 reps  - Seated Ankle Alphabet  - 1 x daily - 7 x weekly - 3 sets - 10 reps  - Seated Heel Raise  - 1 x daily - 7 x weekly - 3 sets - 10 reps  - Seated Ankle Dorsiflexion AROM  - 1 x daily - 7 x weekly - 3 sets - 10 reps    Charges:  TherEx: 2 units, manual tech: 1 unit, Vasopneumatic devuce: 1 unit         Total Timed Treatment: 50 min  Total Treatment Time: 60 min

## 2023-11-10 ENCOUNTER — OFFICE VISIT (OUTPATIENT)
Dept: PHYSICAL THERAPY | Age: 43
End: 2023-11-10
Attending: PODIATRIST
Payer: OTHER MISCELLANEOUS

## 2023-11-10 PROCEDURE — 97110 THERAPEUTIC EXERCISES: CPT

## 2023-11-10 PROCEDURE — 97140 MANUAL THERAPY 1/> REGIONS: CPT

## 2023-11-10 NOTE — PROGRESS NOTES
Diagnosis:   Avulsion fracture (T14.8XXA)  Ankle instability, unspecified laterality (T77.496)         Referring Provider: Divina Kim  Date of Evaluation:    10/17/23    Precautions:  None Next MD visit:   none scheduled  Date of Surgery: n/a   Insurance Primary/Secondary: [employer] COMP / N/A     # Auth Visits: 8 visits            Subjective: Pt reports that she had a lot of pain since her appointment Wednesday. Notes that she was walking a lot today visiting her grandmother. Pain: 3-4/10 at rest, up to 8/10 with activities most days      Objective:       Foot/Ankle AROM: (* denotes performed with pain)  11/8 11/10   DF:  L 3*  IN: 35*  EV: 20*   DF: 1*  IN: 32 *  EV: 21*     TTP of (L) medial malleolus, minimal TTP through gastroc/soleus today      Assessment:Patient presents with similar ROM as compared to last visit. Continues to display significant fatigue and pain with gentle isometrics in clinic. For this reason, weight bearing exercises again held. Emphasized gentle isometrics to continue foot strengthening and promoting muscle contraction endurance; hip strengthening bilaterally to improve LE alignment in standing for improved ankle mechanics.      Goals:   (to be met in 12 visits)  Pt will demonstrate improved DF AROM to >= 10 degrees to promote proper foot clearance during gait and greater ease descending stairs without compensation  Pt will have increased ankle strength to 5/5 throughout for improved ankle control with ADLs such as prolonged gait and stair negotiation   Pt will have improved SLS to >10 sec on airex for increased ankle stability with ambulation on uneven surfaces such as gravel and grass   Pt will report <2/10 pain with work activities like squatting, stooping and floor to stand transfers   Pt will demonstrate ability to lift and carry 40 lbs x 20 feet to demonstrate readiness to return to full duty at work  Pt will be independent and compliant with comprehensive HEP to maintain progress achieved in PT     Plan: PN NEXT FOR AUTH  Date:  11/3/23               TX#: 4/8 Date: 11/7/23                TX#: 5/8 Date:11/8/23   Tx#: 6/8 Date: 11/10/2023  Tx#: 7/8      Manual tech: 15 min   -PROM (L) ankle in all directions to tolerance  -manual gastroc stretching   -STM to gastroc/soleus and posterior tibialis Manual tech: 15 min   -PROM (L) ankle in all directions to tolerance  -manual gastroc stretching   -STM to gastroc/soleus and posterior tibialis Manual:15 min   -PROM (L) ankle in all directions to tolerance  -manual gastroc stretching   -STM to gastroc/soleus and posterior tibialis   TherEx: 45 min  -Nustep: 5 min, level I   -resisted ankle DF, inv, ezio: YTB, 2 x 15-20  Bridge w/ resistance 2 x 15   Clams w/ resistance 2 x 15  S/L hip abd 2 x 15  (Focus on non WB for now due to increased pain)          Next: Standing tandem balance  Standing marching  Step ups TherEx: 35 min  -SciFit: 5 min, level I   -resisted ankle isometrics: DF, inv, ezio: 10 sec x5 reps  -Bridge: 3x10  Clams w/ resistance: RTB,  3x10  S/L hip abd: 3x10  -SLR: 3x10        Next: Standing tandem balance  Standing marching  Step ups TherEx: 35 min  -SciFit: 5 min, level I   -resisted ankle isometrics: DF, inv, ezio: 10 sec x5 reps  -Bridge: 3x10  Clams w/ resistance: RTB,  3x10  S/L hip abd: 3x10  -SLR: 3x10        Next: Standing tandem balance  Standing marching  Step ups TherEx: 30 min  -resisted ankle isometrics with ball : DF, inv, ezio: 10 sec x5 reps  Ankle alphabets, 2 rounds  -Bridge: 3x10  -S/L hip abd: 3x10 B  -SLR: 3x10 L  -prone hip ext, 3x10 B    Next: Standing tandem balance  Standing marching  Step ups      Modality: Vasopneumatic device: (L) ankle x10 min Modality: Vasopneumatic device: (L) ankle x10 min    HEP:   Access Code: [email or web address]  URL: [email or web address]  Date: 10/17/2023  Prepared by: Nell Quinteros     Exercises  - Seated Ankle Pumps on Table  - 1 x daily - 7 x weekly - 3 sets - 10 reps  - Seated Ankle Inversion Eversion AROM  - 1 x daily - 7 x weekly - 3 sets - 10 reps  - Seated Ankle Circles  - 1 x daily - 7 x weekly - 3 sets - 10 reps  - Seated Ankle Alphabet  - 1 x daily - 7 x weekly - 3 sets - 10 reps  - Seated Heel Raise  - 1 x daily - 7 x weekly - 3 sets - 10 reps  - Seated Ankle Dorsiflexion AROM  - 1 x daily - 7 x weekly - 3 sets - 10 reps    Charges:  TherEx: 2 units, manual tech: 1 unit   Total Timed Treatment: 45 min  Total Treatment Time: 45 min

## 2023-11-14 ENCOUNTER — OFFICE VISIT (OUTPATIENT)
Dept: PHYSICAL THERAPY | Age: 43
End: 2023-11-14
Attending: PODIATRIST
Payer: OTHER MISCELLANEOUS

## 2023-11-14 ENCOUNTER — TELEPHONE (OUTPATIENT)
Dept: PHYSICAL THERAPY | Facility: HOSPITAL | Age: 43
End: 2023-11-14

## 2023-11-14 ENCOUNTER — TELEPHONE (OUTPATIENT)
Dept: PHYSICAL THERAPY | Age: 43
End: 2023-11-14

## 2023-11-14 PROCEDURE — 97140 MANUAL THERAPY 1/> REGIONS: CPT | Performed by: PHYSICAL THERAPIST

## 2023-11-14 PROCEDURE — 97110 THERAPEUTIC EXERCISES: CPT | Performed by: PHYSICAL THERAPIST

## 2023-11-14 NOTE — PROGRESS NOTES
Progress Summary  Pt has attended 7 visits in Physical Therapy. Diagnosis:   Avulsion fracture (T14.8XXA)  Ankle instability, unspecified laterality (V72.058)         Referring Provider: Divina Kim  Date of Evaluation:    10/17/23    Precautions:  None Next MD visit:   none scheduled  Date of Surgery: n/a   Insurance Primary/Secondary: [employer] COMP / N/A     # Auth Visits: 8 visits            Subjective: Pt reports that her pain increases with increased activity like walking and standing even in the boot. When she is attempting to walk out of the boot you feel a lot of pain especially with twisting and lateral movements. She spent some time out of the boot ~ 3 hours to test it and she woke up in the middle of the night with pain and feeling pain this morning. The pain is generally in the inside ankle bone  and behind the bone and some lateral ankle \"achiness\". She states that since starting PT she has not noticed a major changes in function. Pain: 4-5/10 at rest, up to 7/10 with activities most days      Objective:     Observation: BL pronation and pes planus, BL knee genu valgum and genu recurvatum    Palpation: Significant tenderness to palpation of (L) medial malleolus with noted edema, mild tenderness at ATFL, mild tenderness at ankle mortise, and mild tenderness at posteror tibialis and medial gastroc/soleus.         AROM: (* denotes performed with pain)  Knee Foot/Ankle   Flexion: R WNL; L WNL  Extension: R WNL; L WNL    DF: R 7; L 5*  PF: R 65; L 65  INV: R 45; L 35*  EV: R 18; L 15*  Great toe ext: R WNL; L WNL      Accessory motion: Hypomobile TC posterior glide, mild pain     Flexibility:  Gastroc-soleus: R 7; L 5*     Strength/MMT: (* denotes performed with pain)  Knee Foot/Ankle   Flexion: R 5/5; L 5/5  Extension: R 5/5; L 5/5    DF: R 5/5; L 5/5  PF: R 4+/5; L 4/5 (non weight bearing)  INV: R 4+/5; L 4/5*  EV: R 4+/5; L 4/5  Great toe ext: R 4/5; L 4/5         Gait: some improvement since IE with reduced antalgia and improved step length for short distances only when ambulating independently without CAM boot. Assessment: Patient has been seen in skilled PT for 8 visits with focus on (L) ankle AROM, stretching, manual tech, gentle strengthening of (L) ankle and (L) hip/knee and attempt to improve weight bearing tolerance. She does not report any improvements in functional activities and continues to be limited in ability to ambulate out of the boot secondary to (L) ankle pain medial aspect>lateral. Objectively she has not shown any improvements in (L) ankle AROM and tends to reports pain and aggravation of symptoms after performing ROM and stretching exercises. She has demonstrated improved ankle strength and improved gait mechanics. Overall, she reports increase in ankle pain after PT that can last for up to 2 days. She also reports aggravation of symptoms with increased walking and performing ADL's. Pt has not demonstrated any functional gains in PT but continues to try to wean her self out of the CAM boot gradually but seems to be limited by pain. Overall, she has demonstrated minimal objective improvements in PT thus far. She may benefit from continued PT to promote improved ankle strength, stability and weight bearing for improved gait and tolerance to standing and walking. Due to minimal improvements this far MD please advise on next course of action.      Goals:   (to be met in 16 visits)  Pt will demonstrate improved DF AROM to >= 10 degrees to promote proper foot clearance during gait and greater ease descending stairs without compensation-No Change in ROM, improved gait mechanics  Pt will have increased ankle strength to 5/5 throughout for improved ankle control with ADLs such as prolonged gait and stair negotiation-Progressing   Pt will have improved SLS to >10 sec on airex for increased ankle stability with ambulation on uneven surfaces such as gravel and grass-Not tested  Pt will report <2/10 pain with work activities like squatting, stooping and floor to stand transfers -Pain not reducing   Pt will demonstrate ability to lift and carry 40 lbs x 20 feet to demonstrate readiness to return to full duty at [address] progressing   Pt will be independent and compliant with comprehensive HEP to maintain progress achieved in PT - on going     LEFS Score  LEFS Score: 36.25 % (10/17/2023  2:36 PM)    Post LEFS Score  Post LEFS Score: 28.75 % (11/14/2023 10:31 AM)    -7.5 % improvement    Plan: Continue skilled Physical Therapy 2 x/week or a total of 8 visits over a 90 day period. Treatment will include: TherEx, Neuro re-ed, manual tech and modalities prn        Patient/Family/Caregiver was advised of these findings, precautions, and treatment options and has agreed to actively participate in planning and for this course of care. Thank you for your referral. If you have any questions, please contact me at Dept: [phone]. Sincerely,  Electronically signed by therapist: Jose Galarza, FARAZ     Physician's certification required:  Yes  Please co-sign or sign and return this letter via fax as soon as possible to [phone]. I certify the need for these services furnished under this plan of treatment and while under my care.     X___________________________________________________ Date____________________    Certification From: 58/07/2070  To:2/12/2024    Date:  11/3/23               TX#: 4/8 Date: 11/7/23                TX#: 5/8 Date: 11/10/2023  Tx#: 6/8   Date: 11/14/2023  Tx#: 7/8    Manual tech: 15 min   -PROM (L) ankle in all directions to tolerance  -manual gastroc stretching   -STM to gastroc/soleus and posterior tibialis Manual:15 min   -PROM (L) ankle in all directions to tolerance  -manual gastroc stretching   -STM to gastroc/soleus and posterior tibialis Manual:15 min   -PROM (L) ankle in all directions to tolerance  -manual gastroc stretching   -STM to gastroc/soleus and posterior tibialis TherEx: 45 min  -Nustep: 5 min, level I   -resisted ankle DF, inv, ezio: YTB, 2 x 15-20  Bridge w/ resistance 2 x 15   Clams w/ resistance 2 x 15  S/L hip abd 2 x 15  (Focus on non WB for now due to increased pain)          Next: Standing tandem balance  Standing marching  Step ups TherEx: 35 min  -SciFit: 5 min, level I   -resisted ankle isometrics: DF, inv, ezio: 10 sec x5 reps  -Bridge: 3x10  Clams w/ resistance: RTB,  3x10  S/L hip abd: 3x10  -SLR: 3x10        Next: Standing tandem balance  Standing marching  Step ups TherEx: 30 min  -resisted ankle isometrics with ball : DF, inv, ezio: 10 sec x5 reps  Ankle alphabets, 2 rounds  -Bridge: 3x10  -S/L hip abd: 3x10 B  -SLR: 3x10 L  -prone hip ext, 3x10 B    Next: Standing tandem balance  Standing marching  Step ups   TherEx: 30 min  -Reassessment  -resisted ankle isometrics with ball : DF, inv, ezio: 10 sec x5 reps  -towel scrunches: x2 min      Modality: Vasopneumatic device: (L) ankle x10 min     HEP:   Access Code: [email or web address]  URL: [email or web address]  Date: 10/17/2023  Prepared by: Netta Skiff     Exercises  - Seated Ankle Pumps on Table  - 1 x daily - 7 x weekly - 3 sets - 10 reps  - Seated Ankle Inversion Eversion AROM  - 1 x daily - 7 x weekly - 3 sets - 10 reps  - Seated Ankle Circles  - 1 x daily - 7 x weekly - 3 sets - 10 reps  - Seated Ankle Alphabet  - 1 x daily - 7 x weekly - 3 sets - 10 reps  - Seated Heel Raise  - 1 x daily - 7 x weekly - 3 sets - 10 reps  - Seated Ankle Dorsiflexion AROM  - 1 x daily - 7 x weekly - 3 sets - 10 reps    Charges:  TherEx: 2 units, manual tech: 1 unit   Total Timed Treatment: 45 min  Total Treatment Time: 45 min

## 2023-11-16 ENCOUNTER — TELEPHONE (OUTPATIENT)
Dept: PHYSICAL THERAPY | Facility: HOSPITAL | Age: 43
End: 2023-11-16

## 2023-11-16 ENCOUNTER — OFFICE VISIT (OUTPATIENT)
Dept: ORTHOPEDICS CLINIC | Facility: CLINIC | Age: 43
End: 2023-11-16
Payer: OTHER MISCELLANEOUS

## 2023-11-16 ENCOUNTER — APPOINTMENT (OUTPATIENT)
Dept: PHYSICAL THERAPY | Age: 43
End: 2023-11-16
Attending: PODIATRIST
Payer: OTHER MISCELLANEOUS

## 2023-11-16 ENCOUNTER — OFFICE VISIT (OUTPATIENT)
Dept: PHYSICAL THERAPY | Age: 43
End: 2023-11-16
Attending: PODIATRIST
Payer: OTHER MISCELLANEOUS

## 2023-11-16 VITALS — HEIGHT: 65 IN | BODY MASS INDEX: 48.32 KG/M2 | WEIGHT: 290 LBS

## 2023-11-16 DIAGNOSIS — M25.373 ANKLE INSTABILITY, UNSPECIFIED LATERALITY: ICD-10-CM

## 2023-11-16 DIAGNOSIS — R93.7 BONE MARROW EDEMA: ICD-10-CM

## 2023-11-16 DIAGNOSIS — S93.422D TEAR OF DELTOID LIGAMENT OF LEFT ANKLE, SUBSEQUENT ENCOUNTER: ICD-10-CM

## 2023-11-16 DIAGNOSIS — T14.8XXA AVULSION FRACTURE: Primary | ICD-10-CM

## 2023-11-16 DIAGNOSIS — M77.9 TENDONITIS: ICD-10-CM

## 2023-11-16 DIAGNOSIS — M89.9 OSTEOCHONDRAL LESION OF TALAR DOME: ICD-10-CM

## 2023-11-16 DIAGNOSIS — M94.9 OSTEOCHONDRAL LESION OF TALAR DOME: ICD-10-CM

## 2023-11-16 PROCEDURE — 3008F BODY MASS INDEX DOCD: CPT | Performed by: PODIATRIST

## 2023-11-16 PROCEDURE — 99214 OFFICE O/P EST MOD 30 MIN: CPT | Performed by: PODIATRIST

## 2023-11-16 PROCEDURE — 97140 MANUAL THERAPY 1/> REGIONS: CPT

## 2023-11-16 PROCEDURE — 97110 THERAPEUTIC EXERCISES: CPT

## 2023-11-16 NOTE — PROGRESS NOTES
Diagnosis:   Avulsion fracture (T14.8XXA)  Ankle instability, unspecified laterality (K85.843)         Referring Provider: Elbert Abdalla  Date of Evaluation:    10/17/23    Precautions:  None Next MD visit:   none scheduled  Date of Surgery: n/a   Insurance Primary/Secondary: WORK[employer] COMP / N/A     # Auth Visits: 8 visits            Subjective: Pt reports that her has been the worst it has been today, and reported the same feelings to Dr. Elbert Abdalla this morning. Her pain has been sitting higher than usual and has had to lie down and keep her foot elevated. Pain: 6-7/10 upon arrival, 8-9/10 at worst      Objective:     Observation: reduced arch collapse of L foot after taping. Assessment: Patient was able to tolerate today's tx well with minimal pain and discomfort. Was initiated on higher resistance ankle strengthening as well as taping of metatarsal and longitudinal arch for improved stability.      Goals:   (to be met in 16 visits)  Pt will demonstrate improved DF AROM to >= 10 degrees to promote proper foot clearance during gait and greater ease descending stairs without compensation-No Change in ROM, improved gait mechanics  Pt will have increased ankle strength to 5/5 throughout for improved ankle control with ADLs such as prolonged gait and stair negotiation-Progressing   Pt will have improved SLS to >10 sec on airex for increased ankle stability with ambulation on uneven surfaces such as gravel and grass-Not tested  Pt will report <2/10 pain with work activities like squatting, stooping and floor to stand transfers -Pain not reducing   Pt will demonstrate ability to lift and carry 40 lbs x 20 feet to demonstrate readiness to return to full duty at [address] progressing   Pt will be independent and compliant with comprehensive HEP to maintain progress achieved in PT - on going     Plan: progress ankle strengthening as tolerated  Date:  11/3/23               TX#: 4/8 Date: 11/7/23                TX#: 5/8 Date: 11/10/2023  Tx#: 6/8   Date: 11/14/2023  Tx#: 7/8 Date: 11/16/23  Tx# 8/8    Manual tech: 15 min   -PROM (L) ankle in all directions to tolerance  -manual gastroc stretching   -STM to gastroc/soleus and posterior tibialis Manual:15 min   -PROM (L) ankle in all directions to tolerance  -manual gastroc stretching   -STM to gastroc/soleus and posterior tibialis Manual:15 min   -PROM (L) ankle in all directions to tolerance  -manual gastroc stretching   -STM to gastroc/soleus and posterior tibialis Manual: 30 min   -Taping of metatarsal and longitudinal arch w/ leukotape  -manual gastroc stretching   -STM to gastroc/soleus and posterior tibialis   TherEx: 45 min  -Nustep: 5 min, level I   -resisted ankle DF, inv, ezio: YTB, 2 x 15-20  Bridge w/ resistance 2 x 15   Clams w/ resistance 2 x 15  S/L hip abd 2 x 15  (Focus on non WB for now due to increased pain)          Next: Standing tandem balance  Standing marching  Step ups TherEx: 35 min  -SciFit: 5 min, level I   -resisted ankle isometrics: DF, inv, ezio: 10 sec x5 reps  -Bridge: 3x10  Clams w/ resistance: RTB,  3x10  S/L hip abd: 3x10  -SLR: 3x10        Next: Standing tandem balance  Standing marching  Step ups TherEx: 30 min  -resisted ankle isometrics with ball : DF, inv, ezio: 10 sec x5 reps  Ankle alphabets, 2 rounds  -Bridge: 3x10  -S/L hip abd: 3x10 B  -SLR: 3x10 L  -prone hip ext, 3x10 B    Next: Standing tandem balance  Standing marching  Step ups   TherEx: 30 min  -Reassessment  -resisted ankle isometrics with ball : DF, inv, ezio: 10 sec x5 reps  -towel scrunches: x2 min   TherEx: 15 min  4 way ankle GTB x 20         Modality: Vasopneumatic device: (L) ankle x10 min      HEP:   Access Code: [email or web address]  URL: [email or web address]  Date: 11/16/2023  Prepared by: Anival Ramos    Exercises  - Seated Ankle Pumps on Table  - 1 x daily - 7 x weekly - 3 sets - 10 reps  - Seated Ankle Inversion Eversion AROM  - 1 x daily - 7 x weekly - 3 sets - 10 reps  - Seated Ankle Circles  - 1 x daily - 7 x weekly - 3 sets - 10 reps  - Seated Ankle Alphabet  - 1 x daily - 7 x weekly - 3 sets - 10 reps  - Seated Heel Raise  - 1 x daily - 7 x weekly - 3 sets - 10 reps  - Seated Ankle Dorsiflexion AROM  - 1 x daily - 7 x weekly - 3 sets - 10 reps  - Hooklying Clamshell with Resistance  - 2 x daily - 7 x weekly - 2 sets - 15 reps - 3 hold  - Standing Hip Abduction with Resistance at Ankles and Counter Support  - 2 x daily - 7 x weekly - 2 sets - 15 reps - 3 hold  - Seated Ankle Inversion with Resistance and Legs Crossed  - 2 x daily - 7 x weekly - 2 sets - 10 reps - 3 hold  - Seated Ankle Eversion with Resistance  - 2 x daily - 7 x weekly - 2 sets - 10 reps - 3 hold  - Ankle and Toe Plantarflexion with Resistance  - 2 x daily - 7 x weekly - 2 sets - 10 reps - 3 hold  - Seated Ankle Dorsiflexion with Resistance  - 2 x daily - 7 x weekly - 2 sets - 10 reps - 3 hold    Charges:  TherEx: 1 units, manual tech: 2 unit   Total Timed Treatment: 45 min  Total Treatment Time: 45 min

## 2023-11-16 NOTE — PROGRESS NOTES
[facility] Progress Note    Subjective:     Bruno Ramos is here for follow-up and also to go over her MRI results of the left ankle  She is now 2 months post injury and is still having pain. When she tries to get out of the boot she has discomfort. She gets some benefit from PT but overall has not really improved. She does have instability of the ankle but feels this more on the right side and not the affected left side        Objective:     Exam left foot and ankle mild swelling. Full range of motion but she does have tenderness at the medial malleolus and underlying along the medial fibers of the deltoid ligament. Also pain posterior lateral and medial aspect of the ankle. Most of her discomfort is medially          Imaging: No new imaging but we did review the MRI together    IMPRESSION:   1. Low to intermediate grade sprain/partial tear of the deltoid ligament, with a previously demonstrated avulsion fragment difficult to appreciate on MRI. Mild spring ligament sprain. Suggestion of pes planus. 2. Mild anterior talofibular and calcaneofibular ligament sprain. 3. Mild bone marrow edema at the medial talar dome may be degenerative in etiology, but osteochondral injury could also have this appearance. No significant articular surface collapse. 4. Mild posterior tibial tendinosis and tenosynovitis. Mild Achilles tendinosis. 5. Small ganglion cyst along the plantar aspect of the medial intermediate cuneiform articulation. Assessment/Plan:     Diagnoses and all orders for this visit:    Avulsion fracture    Tear of deltoid ligament of left ankle, subsequent encounter    Ankle instability, unspecified laterality    Osteochondral lesion of talar dome    Discussed the MRI findings and went through each of the impressions and looked at the films together.   Most of her pain does seem to involve the medial aspect and my feeling is that this is due to the osteochondral lesion and to a lesser extent the deltoid tear. May consider orthotic long-term because she does have flatfeet    Joe Gonzalez -for her thoughts on surgery at this point, 2 months post injury and still having problems and pain  She may be getting a second opinion as well through [employer]. Continue PT      Anne Lama. Molina Lozano, DPM  [facility]    Equals6 speech recognition software was used to prepare this note. If a word or phrase is confusing, it is likely do to a failure of recognition. Please contact me with any questions or clarifications.

## 2023-11-17 ENCOUNTER — APPOINTMENT (OUTPATIENT)
Dept: PHYSICAL THERAPY | Age: 43
End: 2023-11-17
Payer: OTHER MISCELLANEOUS

## 2023-11-22 ENCOUNTER — TELEPHONE (OUTPATIENT)
Dept: PHYSICAL THERAPY | Facility: HOSPITAL | Age: 43
End: 2023-11-22

## 2023-11-22 ENCOUNTER — APPOINTMENT (OUTPATIENT)
Dept: PHYSICAL THERAPY | Age: 43
End: 2023-11-22
Payer: OTHER MISCELLANEOUS

## 2023-11-28 NOTE — PROGRESS NOTES
Diagnosis:   Avulsion fracture (T14.8XXA)  Ankle instability, unspecified laterality (Y44.194)         Referring Provider: Mario Segovia  Date of Evaluation:    10/17/23    Precautions:  None Next MD visit:   none scheduled  Date of Surgery: n/a   Insurance Primary/Secondary: WORKERS COMP / N/A     # Auth Visits: 8 visits            Subjective: Pt reports that her has been the worst it has been today, and reported the same feelings to Dr. Mario Segovia this morning. Her pain has been sitting higher than usual and has had to lie down and keep her foot elevated. Pain: 6-7/10 upon arrival, 8-9/10 at worst      Objective:     Observation: reduced arch collapse of L foot after taping. Assessment: Patient was able to tolerate today's tx well with minimal pain and discomfort. Was initiated on higher resistance ankle strengthening as well as taping of metatarsal and longitudinal arch for improved stability.      Goals:   (to be met in 16 visits)  Pt will demonstrate improved DF AROM to >= 10 degrees to promote proper foot clearance during gait and greater ease descending stairs without compensation-No Change in ROM, improved gait mechanics  Pt will have increased ankle strength to 5/5 throughout for improved ankle control with ADLs such as prolonged gait and stair negotiation-Progressing   Pt will have improved SLS to >10 sec on airex for increased ankle stability with ambulation on uneven surfaces such as gravel and grass-Not tested  Pt will report <2/10 pain with work activities like squatting, stooping and floor to stand transfers -Pain not reducing   Pt will demonstrate ability to lift and carry 40 lbs x 20 feet to demonstrate readiness to return to full duty at [address] progressing   Pt will be independent and compliant with comprehensive HEP to maintain progress achieved in PT - on going     Plan: progress ankle strengthening as tolerated  Date: 11/10/2023  Tx#: 6/8   Date: 11/14/2023  Tx#: 7/8 Date: 11/16/23  Tx# 8/8 Date: 11/29/23  Tx# 9/16   Manual:15 min   -PROM (L) ankle in all directions to tolerance  -manual gastroc stretching   -STM to gastroc/soleus and posterior tibialis Manual:15 min   -PROM (L) ankle in all directions to tolerance  -manual gastroc stretching   -STM to gastroc/soleus and posterior tibialis Manual: 30 min   -Taping of metatarsal and longitudinal arch w/ leukotape  -manual gastroc stretching   -STM to gastroc/soleus and posterior tibialis Manual: 30 min   -Taping of metatarsal and longitudinal arch w/ leukotape  -manual gastroc stretching   -STM to gastroc/soleus and posterior tibialis   TherEx: 30 min  -resisted ankle isometrics with ball : DF, inv, ezio: 10 sec x5 reps  Ankle alphabets, 2 rounds  -Bridge: 3x10  -S/L hip abd: 3x10 B  -SLR: 3x10 L  -prone hip ext, 3x10 B    Next: Standing tandem balance  Standing marching  Step ups   TherEx: 30 min  -Reassessment  -resisted ankle isometrics with ball : DF, inv, ezio: 10 sec x5 reps  -towel scrunches: x2 min   TherEx: 15 min  4 way ankle GTB x 20      TherEx: 15 min  4 way ankle GTB x 20         HEP:   Access Code: [email or web address]  URL: [email or web address]  Date: 11/16/2023  Prepared by: Mikel Pedro    Exercises  - Seated Ankle Pumps on Table  - 1 x daily - 7 x weekly - 3 sets - 10 reps  - Seated Ankle Inversion Eversion AROM  - 1 x daily - 7 x weekly - 3 sets - 10 reps  - Seated Ankle Circles  - 1 x daily - 7 x weekly - 3 sets - 10 reps  - Seated Ankle Alphabet  - 1 x daily - 7 x weekly - 3 sets - 10 reps  - Seated Heel Raise  - 1 x daily - 7 x weekly - 3 sets - 10 reps  - Seated Ankle Dorsiflexion AROM  - 1 x daily - 7 x weekly - 3 sets - 10 reps  - Hooklying Clamshell with Resistance  - 2 x daily - 7 x weekly - 2 sets - 15 reps - 3 hold  - Standing Hip Abduction with Resistance at Ankles and Counter Support  - 2 x daily - 7 x weekly - 2 sets - 15 reps - 3 hold  - Seated Ankle Inversion with Resistance and Legs Crossed  - 2 x daily - 7 x weekly - 2 sets - 10 reps - 3 hold  - Seated Ankle Eversion with Resistance  - 2 x daily - 7 x weekly - 2 sets - 10 reps - 3 hold  - Ankle and Toe Plantarflexion with Resistance  - 2 x daily - 7 x weekly - 2 sets - 10 reps - 3 hold  - Seated Ankle Dorsiflexion with Resistance  - 2 x daily - 7 x weekly - 2 sets - 10 reps - 3 hold    Charges:  TherEx: 1 units, manual tech: 2 unit   Total Timed Treatment: 45 min  Total Treatment Time: 45 min

## 2023-11-29 ENCOUNTER — APPOINTMENT (OUTPATIENT)
Dept: PHYSICAL THERAPY | Age: 43
End: 2023-11-29
Attending: PODIATRIST
Payer: OTHER MISCELLANEOUS

## 2023-12-01 ENCOUNTER — OFFICE VISIT (OUTPATIENT)
Dept: PHYSICAL THERAPY | Age: 43
End: 2023-12-01
Attending: PODIATRIST
Payer: OTHER MISCELLANEOUS

## 2023-12-01 PROCEDURE — 97110 THERAPEUTIC EXERCISES: CPT

## 2023-12-01 NOTE — PROGRESS NOTES
Diagnosis:   Avulsion fracture (T14.8XXA)  Ankle instability, unspecified laterality (X58.523)         Referring Provider: Lawrence Davis  Date of Evaluation:    10/17/23    Precautions:  None Next MD visit:   none scheduled  Date of Surgery: n/a   Insurance Primary/Secondary: [employer] COMP / N/A     # Auth Visits: 16 visits            Subjective: Pt reports that her pain is better today than it has been in recent times, she is seeing a surgeon later this month for another opinion. Pain: 4-5/10 upon arrival, 8-9/10 at worst      Objective:     Observation: reduced arch collapse of L foot after taping. Assessment: Patient was able to tolerate today's tx well without additional pain or discomfort. Patient able to display good competency w/ self performed taping of the medial and longitudinal arches of the L LE. Resistance for ankle strengthening was kept the same as previous due to pain.      Goals:   (to be met in 16 visits)  Pt will demonstrate improved DF AROM to >= 10 degrees to promote proper foot clearance during gait and greater ease descending stairs without compensation-No Change in ROM, improved gait mechanics  Pt will have increased ankle strength to 5/5 throughout for improved ankle control with ADLs such as prolonged gait and stair negotiation-Progressing   Pt will have improved SLS to >10 sec on airex for increased ankle stability with ambulation on uneven surfaces such as gravel and grass-Not tested  Pt will report <2/10 pain with work activities like squatting, stooping and floor to stand transfers -Pain not reducing   Pt will demonstrate ability to lift and carry 40 lbs x 20 feet to demonstrate readiness to return to full duty at [address] progressing   Pt will be independent and compliant with comprehensive HEP to maintain progress achieved in PT - on going     Plan: progress ankle strengthening as tolerated  Date: 11/7/23                TX#: 5/8 Date: 11/10/2023  Tx#: 6/8   Date: 11/14/2023  Tx#: 7/8 Date: 11/16/23  Tx# 8/8 Date: 12/1/23  Tx# 9/16   Manual tech: 15 min   -PROM (L) ankle in all directions to tolerance  -manual gastroc stretching   -STM to gastroc/soleus and posterior tibialis Manual:15 min   -PROM (L) ankle in all directions to tolerance  -manual gastroc stretching   -STM to gastroc/soleus and posterior tibialis Manual:15 min   -PROM (L) ankle in all directions to tolerance  -manual gastroc stretching   -STM to gastroc/soleus and posterior tibialis Manual: 30 min   -Taping of metatarsal and longitudinal arch w/ leukotape  -manual gastroc stretching   -STM to gastroc/soleus and posterior tibialis    TherEx: 35 min  -SciFit: 5 min, level I   -resisted ankle isometrics: DF, inv, ezio: 10 sec x5 reps  -Bridge: 3x10  Clams w/ resistance: RTB,  3x10  S/L hip abd: 3x10  -SLR: 3x10        Next: Standing tandem balance  Standing marching  Step ups TherEx: 30 min  -resisted ankle isometrics with ball : DF, inv, ezio: 10 sec x5 reps  Ankle alphabets, 2 rounds  -Bridge: 3x10  -S/L hip abd: 3x10 B  -SLR: 3x10 L  -prone hip ext, 3x10 B    Next: Standing tandem balance  Standing marching  Step ups   TherEx: 30 min  -Reassessment  -resisted ankle isometrics with ball : DF, inv, ezio: 10 sec x5 reps  -towel scrunches: x2 min   TherEx: 15 min  4 way ankle GTB x 20      TherEx: 35 min  Coaching for taping of longitudinal and metatarsal arch  4 way ankle GTB x 20  Seated heel raise/toe raise w/ UE pressure x 15       Modality: Vasopneumatic device: (L) ankle x10 min       HEP:   Access Code: [email or web address]  URL: [email or web address]  Date: 11/16/2023  Prepared by: Marquita Richardson    Exercises  - Seated Ankle Pumps on Table  - 1 x daily - 7 x weekly - 3 sets - 10 reps  - Seated Ankle Inversion Eversion AROM  - 1 x daily - 7 x weekly - 3 sets - 10 reps  - Seated Ankle Circles  - 1 x daily - 7 x weekly - 3 sets - 10 reps  - Seated Ankle Alphabet  - 1 x daily - 7 x weekly - 3 sets - 10 reps  - Seated Heel Raise  - 1 x daily - 7 x weekly - 3 sets - 10 reps  - Seated Ankle Dorsiflexion AROM  - 1 x daily - 7 x weekly - 3 sets - 10 reps  - Hooklying Clamshell with Resistance  - 2 x daily - 7 x weekly - 2 sets - 15 reps - 3 hold  - Standing Hip Abduction with Resistance at Ankles and Counter Support  - 2 x daily - 7 x weekly - 2 sets - 15 reps - 3 hold  - Seated Ankle Inversion with Resistance and Legs Crossed  - 2 x daily - 7 x weekly - 2 sets - 10 reps - 3 hold  - Seated Ankle Eversion with Resistance  - 2 x daily - 7 x weekly - 2 sets - 10 reps - 3 hold  - Ankle and Toe Plantarflexion with Resistance  - 2 x daily - 7 x weekly - 2 sets - 10 reps - 3 hold  - Seated Ankle Dorsiflexion with Resistance  - 2 x daily - 7 x weekly - 2 sets - 10 reps - 3 hold    Charges:  TherEx: 2     Total Timed Treatment: 35 min (patient had to leave early)  Total Treatment Time: 35 min (patient had to leave early)

## 2023-12-04 NOTE — PROGRESS NOTES
Diagnosis:   Avulsion fracture (T14.8XXA)  Ankle instability, unspecified laterality (M25.373)          (L)   Referring Provider: Molina Lozano  Date of Evaluation:    10/17/23    Precautions:  None Next MD visit:   none scheduled  Date of Surgery: n/a   Insurance Primary/Secondary: WORK[employer] COMP / N/A     # Auth Visits: 16 visits            Subjective: Pt reports she was able to sit on her foot but not able to do that in then last 2 weeks and she has been pushing it the last 2 weeks due to the holidays and needing to get things done. Still wearing the boot. Feels like motion in the ankle has improved. Still getting shooting pains and painful to pivot (even when in the boot). Improved ability to move her toes with reduce pain. Pain: 4/10 upon arrival, 8-9/10 at worst      Objective:     Observation: reduced arch collapse of L foot after taping. Assessment: Patient was tapped to reduce navicular drop and medial ankle pain. She is able to tolerate ankle 4 way and wobble board taps but with some increase in pain.       Goals:   (to be met in 16 visits)  Pt will demonstrate improved DF AROM to >= 10 degrees to promote proper foot clearance during gait and greater ease descending stairs without compensation-No Change in ROM, improved gait mechanics  Pt will have increased ankle strength to 5/5 throughout for improved ankle control with ADLs such as prolonged gait and stair negotiation-Progressing   Pt will have improved SLS to >10 sec on airex for increased ankle stability with ambulation on uneven surfaces such as gravel and grass-Not tested  Pt will report <2/10 pain with work activities like squatting, stooping and floor to stand transfers -Pain not reducing   Pt will demonstrate ability to lift and carry 40 lbs x 20 feet to demonstrate readiness to return to full duty at [address] progressing   Pt will be independent and compliant with comprehensive HEP to maintain progress achieved in PT - on going     Plan: progress ankle strengthening as tolerated  Date: 11/14/2023  Tx#: 7/8 Date: 11/16/23  Tx# 8/8 Date: 12/1/23  Tx# 9/16 Date: 12/5/23  Tx# 10/16   Manual:15 min   -PROM (L) ankle in all directions to tolerance  -manual gastroc stretching   -STM to gastroc/soleus and posterior tibialis Manual: 30 min   -Taping of metatarsal and longitudinal arch w/ leukotape  -manual gastroc stretching   -STM to gastroc/soleus and posterior tibialis  Manual: 15 min   -Taping for navicular drop  -STM to gastroc/soleus and posterior tibialis   TherEx: 30 min  -Reassessment  -resisted ankle isometrics with ball : DF, inv, ezio: 10 sec x5 reps  -towel scrunches: x2 min   TherEx: 15 min  4 way ankle GTB x 20      TherEx: 35 min  Coaching for taping of longitudinal and metatarsal arch  4 way ankle GTB x 20  Seated heel raise/toe raise w/ UE pressure x 15     TherEx: 30 min  -4 way ankle GTB x 20  -Seated heel raise/toe raise w/ UE pressure x 20  -towel scrunching: x2 min  -seated DL wobble bard: A/P, x20 (easy setting)          HEP:   Access Code: [email or web address]  URL: [email or web address]  Date: 11/16/2023  Prepared by: Marlena Wei    Exercises  - Seated Ankle Pumps on Table  - 1 x daily - 7 x weekly - 3 sets - 10 reps  - Seated Ankle Inversion Eversion AROM  - 1 x daily - 7 x weekly - 3 sets - 10 reps  - Seated Ankle Circles  - 1 x daily - 7 x weekly - 3 sets - 10 reps  - Seated Ankle Alphabet  - 1 x daily - 7 x weekly - 3 sets - 10 reps  - Seated Heel Raise  - 1 x daily - 7 x weekly - 3 sets - 10 reps  - Seated Ankle Dorsiflexion AROM  - 1 x daily - 7 x weekly - 3 sets - 10 reps  - Hooklying Clamshell with Resistance  - 2 x daily - 7 x weekly - 2 sets - 15 reps - 3 hold  - Standing Hip Abduction with Resistance at Ankles and Counter Support  - 2 x daily - 7 x weekly - 2 sets - 15 reps - 3 hold  - Seated Ankle Inversion with Resistance and Legs Crossed  - 2 x daily - 7 x weekly - 2 sets - 10 reps - 3 hold  - Seated Ankle Eversion with Resistance  - 2 x daily - 7 x weekly - 2 sets - 10 reps - 3 hold  - Ankle and Toe Plantarflexion with Resistance  - 2 x daily - 7 x weekly - 2 sets - 10 reps - 3 hold  - Seated Ankle Dorsiflexion with Resistance  - 2 x daily - 7 x weekly - 2 sets - 10 reps - 3 hold    Charges:  TherEx: 2, manual tech: 1 unit       Total Timed Treatment: 45  Total Treatment Time: 45

## 2023-12-05 ENCOUNTER — OFFICE VISIT (OUTPATIENT)
Dept: PHYSICAL THERAPY | Age: 43
End: 2023-12-05
Attending: PODIATRIST
Payer: OTHER MISCELLANEOUS

## 2023-12-05 PROCEDURE — 97110 THERAPEUTIC EXERCISES: CPT | Performed by: PHYSICAL THERAPIST

## 2023-12-05 PROCEDURE — 97140 MANUAL THERAPY 1/> REGIONS: CPT | Performed by: PHYSICAL THERAPIST

## 2023-12-07 ENCOUNTER — TELEPHONE (OUTPATIENT)
Dept: ORTHOPEDICS CLINIC | Facility: CLINIC | Age: 43
End: 2023-12-07

## 2023-12-07 ENCOUNTER — APPOINTMENT (OUTPATIENT)
Dept: PHYSICAL THERAPY | Age: 43
End: 2023-12-07
Attending: PODIATRIST
Payer: OTHER MISCELLANEOUS

## 2023-12-07 ENCOUNTER — TELEPHONE (OUTPATIENT)
Dept: PHYSICAL THERAPY | Age: 43
End: 2023-12-07

## 2023-12-08 ENCOUNTER — OFFICE VISIT (OUTPATIENT)
Dept: PHYSICAL THERAPY | Age: 43
End: 2023-12-08
Attending: PODIATRIST
Payer: OTHER MISCELLANEOUS

## 2023-12-08 PROCEDURE — 97110 THERAPEUTIC EXERCISES: CPT | Performed by: PHYSICAL THERAPIST

## 2023-12-08 PROCEDURE — 97140 MANUAL THERAPY 1/> REGIONS: CPT | Performed by: PHYSICAL THERAPIST

## 2023-12-08 NOTE — PROGRESS NOTES
Diagnosis:   Avulsion fracture (T14.8XXA)  Ankle instability, unspecified laterality (M25.373)          (L)   Referring Provider: Phi Redd  Date of Evaluation:    10/17/23    Precautions:  None Next MD visit:   none scheduled  Date of Surgery: n/a   Insurance Primary/Secondary: WORK[employer] COMP / N/A     # Auth Visits: 16 visits            Subjective: Pt reports her pain has elevated from last tx session. States that the tape seemed to aggravated a bit. She is frustrated that her pain in not improving and that she continues to have a lot of pain with simple walking tasks during ADL's. Pain: 5/10 upon arrival, 7/10 at end of day    Objective:     Observation: (L) ankle pronation. Mild edema over medial/lateral malleoli    Palpation: significant TTP of medial malleolus, lateral malleolus, distal gastroc/soleus and posterior tibialis. AROM (L) ankle: 80% available in all ranges with pain in all motions. Assessment: Patient continues to present with fluctuating symptoms. Typically symptoms increase with light and gentle AROM/strengthening in non-weight bearing positions. She presents with significant tenderness to the (L) medial/lateral malleoli and swelling. She demonstrates high irritability levels and low tolerance to skilled PT. Due to lack of progress and increasing symptoms pt is placed on hold for the time being until she has a second opinion form an ankle specialist in about 1-2 weeks from now. This was communicated to  (referring physician) who agrees with this plan.      Goals:   (to be met in 16 visits)  Pt will demonstrate improved DF AROM to >= 10 degrees to promote proper foot clearance during gait and greater ease descending stairs without compensation-No Change in ROM, improved gait mechanics  Pt will have increased ankle strength to 5/5 throughout for improved ankle control with ADLs such as prolonged gait and stair negotiation-Not progressed since PN  Pt will have improved SLS to >10 sec on airex for increased ankle stability with ambulation on uneven surfaces such as gravel and grass-Not tested due to pain in weight bearing  Pt will report <2/10 pain with work activities like squatting, stooping and floor to stand transfers -Pain not reducing   Pt will demonstrate ability to lift and carry 40 lbs x 20 feet to demonstrate readiness to return to full duty at [address] progressing   Pt will be independent and compliant with comprehensive HEP to maintain progress achieved in PT - on going     Plan: Placed on Hold until further notice. Pt is seeing ankle specialist in mid December. Date: 11/14/2023  Tx#: 7/8 Date: 11/16/23  Tx# 8/8 Date: 12/1/23  Tx# 9/16 Date: 12/5/23  Tx# 10/16   Manual:15 min   -PROM (L) ankle in all directions to tolerance  -manual gastroc stretching   -STM to gastroc/soleus and posterior tibialis Manual: 30 min   -Taping of metatarsal and longitudinal arch w/ leukotape  -manual gastroc stretching   -STM to gastroc/soleus and posterior tibialis  Manual: 25 min   -Taping for longitudinal and metatarsal arch support  -STM to gastroc/soleus and posterior tibialis   TherEx: 30 min  -Reassessment  -resisted ankle isometrics with ball : DF, inv, ezio: 10 sec x5 reps  -towel scrunches: x2 min   TherEx: 15 min  4 way ankle GTB x 20      TherEx: 35 min  Coaching for taping of longitudinal and metatarsal arch  4 way ankle GTB x 20  Seated heel raise/toe raise w/ UE pressure x 15     TherEx: 15 min    (All performed in non-weightbearing)   -toe curls: x30  -towel scrunching: x2 min  -toe yoga: x 5 min   -arch lifting in sitting:       Modalities:   Moist heat on (L) ankle x 10 min    HEP:   Access Code: [email or web address]  URL: [email or web address]  Date: 11/16/2023  Prepared by: Jimmie Fabian    Exercises  - Seated Ankle Pumps on Table  - 1 x daily - 7 x weekly - 3 sets - 10 reps  - Seated Ankle Inversion Eversion AROM  - 1 x daily - 7 x weekly - 3 sets - 10 reps  - Seated Ankle Circles - 1 x daily - 7 x weekly - 3 sets - 10 reps  - Seated Ankle Alphabet  - 1 x daily - 7 x weekly - 3 sets - 10 reps  - Seated Heel Raise  - 1 x daily - 7 x weekly - 3 sets - 10 reps  - Seated Ankle Dorsiflexion AROM  - 1 x daily - 7 x weekly - 3 sets - 10 reps  - Hooklying Clamshell with Resistance  - 2 x daily - 7 x weekly - 2 sets - 15 reps - 3 hold  - Standing Hip Abduction with Resistance at Ankles and Counter Support  - 2 x daily - 7 x weekly - 2 sets - 15 reps - 3 hold  - Seated Ankle Inversion with Resistance and Legs Crossed  - 2 x daily - 7 x weekly - 2 sets - 10 reps - 3 hold  - Seated Ankle Eversion with Resistance  - 2 x daily - 7 x weekly - 2 sets - 10 reps - 3 hold  - Ankle and Toe Plantarflexion with Resistance  - 2 x daily - 7 x weekly - 2 sets - 10 reps - 3 hold  - Seated Ankle Dorsiflexion with Resistance  - 2 x daily - 7 x weekly - 2 sets - 10 reps - 3 hold    Charges:  TherEx: 2, manual tech: 1 unit       Total Timed Treatment: 40  Total Treatment Time: 50

## 2023-12-12 ENCOUNTER — APPOINTMENT (OUTPATIENT)
Dept: PHYSICAL THERAPY | Age: 43
End: 2023-12-12
Attending: PODIATRIST
Payer: OTHER MISCELLANEOUS

## 2023-12-13 ENCOUNTER — HOSPITAL ENCOUNTER (EMERGENCY)
Facility: HOSPITAL | Age: 43
Discharge: HOME | End: 2023-12-13
Attending: EMERGENCY MEDICINE
Payer: OTHER MISCELLANEOUS

## 2023-12-13 VITALS
HEART RATE: 93 BPM | WEIGHT: 280 LBS | TEMPERATURE: 98 F | SYSTOLIC BLOOD PRESSURE: 135 MMHG | HEIGHT: 65 IN | BODY MASS INDEX: 46.65 KG/M2 | OXYGEN SATURATION: 94 % | DIASTOLIC BLOOD PRESSURE: 81 MMHG | RESPIRATION RATE: 17 BRPM

## 2023-12-13 DIAGNOSIS — T78.40XA ALLERGIC REACTION, INITIAL ENCOUNTER: Primary | ICD-10-CM

## 2023-12-13 LAB
ATRIAL RATE: 94 BPM
P AXIS: 50 DEGREES
P-R INTERVAL: 150 MS
Q-T INTERVAL: 334 MS
QRS DURATION: 80 MS
QTC CALCULATION (BEZET): 417 MS
R AXIS: 49 DEGREES
T AXIS: 57 DEGREES
VENTRICULAR RATE: 94 BPM

## 2023-12-13 PROCEDURE — 96374 THER/PROPH/DIAG INJ IV PUSH: CPT

## 2023-12-13 PROCEDURE — 93010 ELECTROCARDIOGRAM REPORT: CPT

## 2023-12-13 PROCEDURE — 99284 EMERGENCY DEPT VISIT MOD MDM: CPT

## 2023-12-13 PROCEDURE — 93005 ELECTROCARDIOGRAM TRACING: CPT

## 2023-12-13 PROCEDURE — S0028 INJECTION, FAMOTIDINE, 20 MG: HCPCS | Performed by: EMERGENCY MEDICINE

## 2023-12-13 PROCEDURE — 96375 TX/PRO/DX INJ NEW DRUG ADDON: CPT

## 2023-12-13 RX ORDER — FAMOTIDINE 10 MG/ML
20 INJECTION, SOLUTION INTRAVENOUS ONCE
Status: COMPLETED | OUTPATIENT
Start: 2023-12-13 | End: 2023-12-13

## 2023-12-13 RX ORDER — TRAMADOL HYDROCHLORIDE 50 MG/1
50 TABLET ORAL EVERY 6 HOURS PRN
Qty: 16 TABLET | Refills: 0 | Status: SHIPPED | OUTPATIENT
Start: 2023-12-13 | End: 2023-12-20

## 2023-12-13 RX ORDER — CETIRIZINE HYDROCHLORIDE 10 MG/1
10 TABLET ORAL DAILY
Qty: 30 TABLET | Refills: 0 | Status: SHIPPED | OUTPATIENT
Start: 2023-12-13 | End: 2024-01-12

## 2023-12-13 RX ORDER — DIPHENHYDRAMINE HYDROCHLORIDE 50 MG/ML
25 INJECTION INTRAMUSCULAR; INTRAVENOUS ONCE
Status: DISCONTINUED | OUTPATIENT
Start: 2023-12-13 | End: 2023-12-13

## 2023-12-13 RX ORDER — ONDANSETRON 2 MG/ML
4 INJECTION INTRAMUSCULAR; INTRAVENOUS ONCE
Status: COMPLETED | OUTPATIENT
Start: 2023-12-13 | End: 2023-12-13

## 2023-12-13 RX ORDER — FAMOTIDINE 20 MG/1
20 TABLET, FILM COATED ORAL 2 TIMES DAILY PRN
Qty: 30 TABLET | Refills: 0 | Status: SHIPPED | OUTPATIENT
Start: 2023-12-13 | End: 2024-01-12

## 2023-12-13 RX ORDER — DIAZEPAM 5 MG/ML
10 INJECTION, SOLUTION INTRAMUSCULAR; INTRAVENOUS
Status: DISCONTINUED | OUTPATIENT
Start: 2023-12-13 | End: 2023-12-13

## 2023-12-13 RX ORDER — KETOROLAC TROMETHAMINE 15 MG/ML
15 INJECTION, SOLUTION INTRAMUSCULAR; INTRAVENOUS ONCE
Status: DISCONTINUED | OUTPATIENT
Start: 2023-12-13 | End: 2023-12-13

## 2023-12-13 RX ORDER — METHYLPREDNISOLONE SODIUM SUCCINATE 125 MG/2ML
125 INJECTION, POWDER, LYOPHILIZED, FOR SOLUTION INTRAMUSCULAR; INTRAVENOUS ONCE
Status: COMPLETED | OUTPATIENT
Start: 2023-12-13 | End: 2023-12-13

## 2023-12-13 RX ORDER — PREDNISONE 20 MG/1
40 TABLET ORAL DAILY
Qty: 8 TABLET | Refills: 0 | Status: SHIPPED | OUTPATIENT
Start: 2023-12-13 | End: 2023-12-17

## 2023-12-13 RX ORDER — KETOROLAC TROMETHAMINE 15 MG/ML
15 INJECTION, SOLUTION INTRAMUSCULAR; INTRAVENOUS ONCE
Status: COMPLETED | OUTPATIENT
Start: 2023-12-13 | End: 2023-12-13

## 2023-12-13 RX ORDER — DIPHENHYDRAMINE HYDROCHLORIDE 50 MG/ML
50 INJECTION INTRAMUSCULAR; INTRAVENOUS ONCE
Status: COMPLETED | OUTPATIENT
Start: 2023-12-13 | End: 2023-12-13

## 2023-12-13 NOTE — ED INITIAL ASSESSMENT (HPI)
Pt presents to the ER with c/o generalized itchiness s/p cortisol shot to left foot during PT. Pt arrives anxious. Speaking in full sentences.  Managing secretions at this time

## 2023-12-20 ENCOUNTER — TELEPHONE (OUTPATIENT)
Dept: PHYSICAL THERAPY | Facility: HOSPITAL | Age: 43
End: 2023-12-20

## 2023-12-27 ENCOUNTER — OFFICE VISIT (OUTPATIENT)
Dept: PHYSICAL THERAPY | Age: 43
End: 2023-12-27
Attending: PODIATRIST
Payer: OTHER MISCELLANEOUS

## 2023-12-27 PROCEDURE — 97110 THERAPEUTIC EXERCISES: CPT

## 2023-12-27 NOTE — PROGRESS NOTES
Diagnosis:   Avulsion fracture (T14.8XXA)  Ankle instability, unspecified laterality (M25.373)          (L)   Referring Provider: Jenna Bagley  Date of Evaluation:    10/17/23    Precautions:  None Next MD visit:   none scheduled  Date of Surgery: n/a   Insurance Primary/Secondary: WORKERS COMP / N/A     # Auth Visits: 16 visits            Subjective: Pt reports her pain hasn't been getting better since the injection. Pain: 4/10 upon arrival, 7/10 during activities    Objective:     Observation: (L) ankle pronation. Mild edema over medial/lateral malleoli    Palpation: significant TTP of medial malleolus, lateral malleolus, distal gastroc/soleus and posterior tibialis. AROM (L) ankle: 80% available in all ranges with pain in all motions. Assessment: Patient continues to present w/ persistent ankle pain during all exercises. Pain is still a limiting factor and she is unable to progress past non resisted foot and ankle exercises in sitting.        Goals:   (to be met in 16 visits)  Pt will demonstrate improved DF AROM to >= 10 degrees to promote proper foot clearance during gait and greater ease descending stairs without compensation-No Change in ROM, improved gait mechanics  Pt will have increased ankle strength to 5/5 throughout for improved ankle control with ADLs such as prolonged gait and stair negotiation-Not progressed since PN  Pt will have improved SLS to >10 sec on airex for increased ankle stability with ambulation on uneven surfaces such as gravel and grass-Not tested due to pain in weight bearing  Pt will report <2/10 pain with work activities like squatting, stooping and floor to stand transfers -Pain not reducing   Pt will demonstrate ability to lift and carry 40 lbs x 20 feet to demonstrate readiness to return to full duty at [address] progressing   Pt will be independent and compliant with comprehensive HEP to maintain progress achieved in PT - on going     Plan: Progress ankle strengthening as per tolerance  Date: 11/14/2023  Tx#: 7/8 Date: 11/16/23  Tx# 8/8 Date: 12/1/23  Tx# 9/16 Date: 12/5/23  Tx# 10/16 Date: 12/27/23  Tx# 11/16   Manual:15 min   -PROM (L) ankle in all directions to tolerance  -manual gastroc stretching   -STM to gastroc/soleus and posterior tibialis Manual: 30 min   -Taping of metatarsal and longitudinal arch w/ leukotape  -manual gastroc stretching   -STM to gastroc/soleus and posterior tibialis  Manual: 25 min   -Taping for longitudinal and metatarsal arch support  -STM to gastroc/soleus and posterior tibialis    TherEx: 30 min  -Reassessment  -resisted ankle isometrics with ball : DF, inv, ezio: 10 sec x5 reps  -towel scrunches: x2 min   TherEx: 15 min  4 way ankle GTB x 20      TherEx: 35 min  Coaching for taping of longitudinal and metatarsal arch  4 way ankle GTB x 20  Seated heel raise/toe raise w/ UE pressure x 15     TherEx: 15 min    (All performed in non-weightbearing)   -toe curls: x30  -towel scrunching: x2 min  -toe yoga: x 5 min   -arch lifting in sitting: TherEx: 40min  Towel scrunches x 10  Toe yoga x 3 min  Toe yoga w/ arch lift 3 min  Windshield wipers x 20  Heel toes x 20            Modalities:   Moist heat on (L) ankle x 10 min     HEP:   Access Code: [email or web address]  URL: [email or web address]  Date: 11/16/2023  Prepared by: Maria Del Rosario Dahl    Exercises  - Seated Ankle Pumps on Table  - 1 x daily - 7 x weekly - 3 sets - 10 reps  - Seated Ankle Inversion Eversion AROM  - 1 x daily - 7 x weekly - 3 sets - 10 reps  - Seated Ankle Circles  - 1 x daily - 7 x weekly - 3 sets - 10 reps  - Seated Ankle Alphabet  - 1 x daily - 7 x weekly - 3 sets - 10 reps  - Seated Heel Raise  - 1 x daily - 7 x weekly - 3 sets - 10 reps  - Seated Ankle Dorsiflexion AROM  - 1 x daily - 7 x weekly - 3 sets - 10 reps  - Hooklying Clamshell with Resistance  - 2 x daily - 7 x weekly - 2 sets - 15 reps - 3 hold  - Standing Hip Abduction with Resistance at Ankles and Counter Support  - 2 x daily - 7 x weekly - 2 sets - 15 reps - 3 hold  - Seated Ankle Inversion with Resistance and Legs Crossed  - 2 x daily - 7 x weekly - 2 sets - 10 reps - 3 hold  - Seated Ankle Eversion with Resistance  - 2 x daily - 7 x weekly - 2 sets - 10 reps - 3 hold  - Ankle and Toe Plantarflexion with Resistance  - 2 x daily - 7 x weekly - 2 sets - 10 reps - 3 hold  - Seated Ankle Dorsiflexion with Resistance  - 2 x daily - 7 x weekly - 2 sets - 10 reps - 3 hold    Charges:  TherEx: 3       Total Timed Treatment: 40  Total Treatment Time: 40

## 2023-12-28 ENCOUNTER — ORDER TRANSCRIPTION (OUTPATIENT)
Dept: ADMINISTRATIVE | Facility: HOSPITAL | Age: 43
End: 2023-12-28

## 2023-12-28 DIAGNOSIS — R94.31 ABNORMAL ELECTROCARDIOGRAM: Primary | ICD-10-CM

## 2023-12-29 ENCOUNTER — TELEPHONE (OUTPATIENT)
Dept: PHYSICAL THERAPY | Facility: HOSPITAL | Age: 43
End: 2023-12-29

## 2024-01-05 ENCOUNTER — TELEPHONE (OUTPATIENT)
Dept: PHYSICAL THERAPY | Facility: HOSPITAL | Age: 44
End: 2024-01-05

## 2024-01-08 ENCOUNTER — OFFICE VISIT (OUTPATIENT)
Dept: PHYSICAL THERAPY | Age: 44
End: 2024-01-08
Attending: PODIATRIST
Payer: OTHER MISCELLANEOUS

## 2024-01-08 PROCEDURE — 97140 MANUAL THERAPY 1/> REGIONS: CPT | Performed by: PHYSICAL THERAPIST

## 2024-01-08 PROCEDURE — 97110 THERAPEUTIC EXERCISES: CPT | Performed by: PHYSICAL THERAPIST

## 2024-01-08 NOTE — PROGRESS NOTES
Diagnosis:   Avulsion fracture (T14.8XXA)  Ankle instability, unspecified laterality (M25.373)          (L)   Referring Provider: Julio  Date of Evaluation:    10/17/23    Precautions:  None Next MD visit:   none scheduled  Date of Surgery: n/a   Insurance Primary/Secondary: WORKERS COMP / N/A     # Auth Visits: 16 visits            Subjective: Pt reports that she has not noticed much support from the ankle brace she was provided. She has been trying to do more at home out of the boot. She is careful and more hesitant with kneeling because of her ankle position, squatting is tough and she needs to be able to do that for work.  She is trying to not wear the boot out side her home like the grocery store but it does get sore faster.     Pain: 4/10 upon arrival    Objective:     Observation: (L) ankle pronation. Mild edema over medial/lateral malleoli (Continued)     Palpation: significant TTP of medial malleolus, lateral malleolus, distal gastroc/soleus and posterior tibialis.(Continued)            Assessment: Patient continues to be aggravated with all exercises and takes short breaks in between. Introduced to arch lifting in weight bearing position while taped and ankle brace donned. Overall she reports increase in pain. She does good with aminating arch lifted during TherEx in weight bearing positions.       Goals:   (to be met in 16 visits)  Pt will demonstrate improved DF AROM to >= 10 degrees to promote proper foot clearance during gait and greater ease descending stairs without compensation-No Change in ROM, improved gait mechanics  Pt will have increased ankle strength to 5/5 throughout for improved ankle control with ADLs such as prolonged gait and stair negotiation-Not progressed since PN  Pt will have improved SLS to >10 sec on airex for increased ankle stability with ambulation on uneven surfaces such as gravel and grass-Not tested due to pain in weight bearing  Pt will report <2/10 pain with work  activities like squatting, stooping and floor to stand transfers -Pain not reducing   Pt will demonstrate ability to lift and carry 40 lbs x 20 feet to demonstrate readiness to return to full duty at work-Not progressing   Pt will be independent and compliant with comprehensive HEP to maintain progress achieved in PT - on going     Plan: Progress ankle stability in weight bearing as tolerated  Date: 12/1/23  Tx# 9/16 Date: 12/5/23  Tx# 10/16 Date: 12/27/23  Tx# 11/16 Date: 1/8/24  Tx# 12/16    Manual: 25 min   -Taping for longitudinal and metatarsal arch support  -STM to gastroc/soleus and posterior tibialis  Manual: 10 min   -STM to gastroc/soleus and posterior tibialis  -Taping to support medial joint w/ stirups    TherEx: 35 min  Coaching for taping of longitudinal and metatarsal arch  4 way ankle GTB x 20  Seated heel raise/toe raise w/ UE pressure x 15     TherEx: 15 min    (All performed in non-weightbearing)   -toe curls: x30  -towel scrunching: x2 min  -toe yoga: x 5 min   -arch lifting in sitting:  TherEx: 40min  Towel scrunches x 10  Toe yoga x 3 min  Toe yoga w/ arch lift 3 min  Windshield wipers x 20  Heel toes x 20       TherEx: 30 min  -Towel scrunches x 10  -Toe yoga x 3 min  -Toe yoga w/ arch lift 3 min  -Heel toes x 20  -standing arch lift: (taped and brace on) x 10 sec x10  -weight shifts to involved side: x20      Modalities:   Moist heat on (L) ankle x 10 min      HEP:   Access Code: [email or web address]  URL: [email or web address]  Date: 11/16/2023  Prepared by: Bernard Beckman    Exercises  - Seated Ankle Pumps on Table  - 1 x daily - 7 x weekly - 3 sets - 10 reps  - Seated Ankle Inversion Eversion AROM  - 1 x daily - 7 x weekly - 3 sets - 10 reps  - Seated Ankle Circles  - 1 x daily - 7 x weekly - 3 sets - 10 reps  - Seated Ankle Alphabet  - 1 x daily - 7 x weekly - 3 sets - 10 reps  - Seated Heel Raise  - 1 x daily - 7 x weekly - 3 sets - 10 reps  - Seated Ankle Dorsiflexion AROM  - 1 x daily - 7 x  weekly - 3 sets - 10 reps  - Hooklying Clamshell with Resistance  - 2 x daily - 7 x weekly - 2 sets - 15 reps - 3 hold  - Standing Hip Abduction with Resistance at Ankles and Counter Support  - 2 x daily - 7 x weekly - 2 sets - 15 reps - 3 hold  - Seated Ankle Inversion with Resistance and Legs Crossed  - 2 x daily - 7 x weekly - 2 sets - 10 reps - 3 hold  - Seated Ankle Eversion with Resistance  - 2 x daily - 7 x weekly - 2 sets - 10 reps - 3 hold  - Ankle and Toe Plantarflexion with Resistance  - 2 x daily - 7 x weekly - 2 sets - 10 reps - 3 hold  - Seated Ankle Dorsiflexion with Resistance  - 2 x daily - 7 x weekly - 2 sets - 10 reps - 3 hold    Charges: TherEx: 2 units, manual tech: 1 unit      Total Timed Treatment: 40  Total Treatment Time: 40

## 2024-01-10 NOTE — TELEPHONE ENCOUNTER
2nd attempt to reach pt, lmtcb. Please obtains details. Forms placed in [facility]MT[facility]/PTS ON HOLD folder.

## 2024-01-12 ENCOUNTER — APPOINTMENT (OUTPATIENT)
Dept: PHYSICAL THERAPY | Age: 44
End: 2024-01-12
Attending: PODIATRIST
Payer: OTHER MISCELLANEOUS

## 2024-01-12 NOTE — PROGRESS NOTES
Diagnosis:   Avulsion fracture (T14.8XXA)  Ankle instability, unspecified laterality (M25.373)          (L)   Referring Provider: Julio  Date of Evaluation:    10/17/23    Precautions:  None Next MD visit:   none scheduled  Date of Surgery: n/a   Insurance Primary/Secondary: WORKERS COMP / N/A     # Auth Visits: 16 visits            Subjective: Pt reports that she has not noticed much support from the ankle brace she was provided. She has been trying to do more at home out of the boot. She is careful and more hesitant with kneeling because of her ankle position, squatting is tough and she needs to be able to do that for work.  She is trying to not wear the boot out side her home like the grocery store but it does get sore faster.     Pain: 3/10 upon arrival, with pivoting 6-7/10    Objective:     Observation: (L) ankle pronation. Mild edema over medial/lateral malleoli (Continued)     Palpation: significant TTP of medial malleolus, lateral malleolus, distal gastroc/soleus and posterior tibialis.(Continued)        Observation: pt demonstrates ability to correct ankle pronation with arch lifting during therEx but continues to reports pain         Assessment: Patient continues to report functional limitations during ADL's such as prolonged standing, walking, kneeling and stooping. She is tapped with kinesio tape to assist ankle DF.  Her weight bearing exercises are progressed to tandem balance, marching and standing on airex to promote ankle stability. She tolerates the exercises but with increase in ankle pain and ends tx on moist heat for pain management.       Goals:   (to be met in 16 visits)  Pt will demonstrate improved DF AROM to >= 10 degrees to promote proper foot clearance during gait and greater ease descending stairs without compensation-No Change in ROM, improved gait mechanics  Pt will have increased ankle strength to 5/5 throughout for improved ankle control with ADLs such as prolonged gait and stair  negotiation-Not progressed since PN  Pt will have improved SLS to >10 sec on airex for increased ankle stability with ambulation on uneven surfaces such as gravel and grass-Not tested due to pain in weight bearing  Pt will report <2/10 pain with work activities like squatting, stooping and floor to stand transfers -Pain not reducing   Pt will demonstrate ability to lift and carry 40 lbs x 20 feet to demonstrate readiness to return to full duty at work-Not progressing   Pt will be independent and compliant with comprehensive HEP to maintain progress achieved in PT - on going     Plan: Progress ankle stability in weight bearing as tolerated, add wobble board balance, standing hip abductons  Date: 12/1/23  Tx# 9/16 Date: 12/5/23  Tx# 10/16 Date: 12/27/23  Tx# 11/16 Date: 1/8/24  Tx# 12/16 Date: 1/15/24  Tx# 13/16    Manual: 25 min   -Taping for longitudinal and metatarsal arch support  -STM to gastroc/soleus and posterior tibialis  Manual: 10 min   -STM to gastroc/soleus and posterior tibialis  -Taping to support medial joint w/ stirups  Manual: 10 min   -STM to gastroc/soleus and posterior tibialis  -kinesio Tapeing to assist in DF   TherEx: 35 min  Coaching for taping of longitudinal and metatarsal arch  4 way ankle GTB x 20  Seated heel raise/toe raise w/ UE pressure x 15     TherEx: 15 min    (All performed in non-weightbearing)   -toe curls: x30  -towel scrunching: x2 min  -toe yoga: x 5 min   -arch lifting in sitting:  TherEx: 40min  Towel scrunches x 10  Toe yoga x 3 min  Toe yoga w/ arch lift 3 min  Windshield wipers x 20  Heel toes x 20       TherEx: 30 min  -Towel scrunches x 10  -Toe yoga x 3 min  -Toe yoga w/ arch lift 3 min  -Heel toes x 20  -standing arch lift: (taped and brace on) x 10 sec x10  -weight shifts to involved side: x20   TherEx: 30 min  -standing arch lift: (taped) x 10 sec x10  -weight shifts to involved side: x20  -marching: x20  -tandem balance: 2x30 sec each position   -Feet together  balance on airex: 2x1 min  -Bridges on YSB: 2x10  -s/l hip abd: 2#, 3x10      Hold for future   -wobble board balance  -standing hip abductions    Modalities:   Moist heat on (L) ankle x 10 min    Modalities:   Moist heat on (L) ankle x 10 min    HEP:   Access Code: [email or web address]  URL: [email or web address]  Date: 11/16/2023  Prepared by: Bernard Beckman    Exercises  - Seated Ankle Pumps on Table  - 1 x daily - 7 x weekly - 3 sets - 10 reps  - Seated Ankle Inversion Eversion AROM  - 1 x daily - 7 x weekly - 3 sets - 10 reps  - Seated Ankle Circles  - 1 x daily - 7 x weekly - 3 sets - 10 reps  - Seated Ankle Alphabet  - 1 x daily - 7 x weekly - 3 sets - 10 reps  - Seated Heel Raise  - 1 x daily - 7 x weekly - 3 sets - 10 reps  - Seated Ankle Dorsiflexion AROM  - 1 x daily - 7 x weekly - 3 sets - 10 reps  - Hooklying Clamshell with Resistance  - 2 x daily - 7 x weekly - 2 sets - 15 reps - 3 hold  - Standing Hip Abduction with Resistance at Ankles and Counter Support  - 2 x daily - 7 x weekly - 2 sets - 15 reps - 3 hold  - Seated Ankle Inversion with Resistance and Legs Crossed  - 2 x daily - 7 x weekly - 2 sets - 10 reps - 3 hold  - Seated Ankle Eversion with Resistance  - 2 x daily - 7 x weekly - 2 sets - 10 reps - 3 hold  - Ankle and Toe Plantarflexion with Resistance  - 2 x daily - 7 x weekly - 2 sets - 10 reps - 3 hold  - Seated Ankle Dorsiflexion with Resistance  - 2 x daily - 7 x weekly - 2 sets - 10 reps - 3 hold    Charges: TherEx: 2 units, manual tech: 1 unit,      Total Timed Treatment: 40 min Total Treatment Time: 50 min

## 2024-01-15 ENCOUNTER — OFFICE VISIT (OUTPATIENT)
Dept: PHYSICAL THERAPY | Age: 44
End: 2024-01-15
Attending: PODIATRIST
Payer: OTHER MISCELLANEOUS

## 2024-01-15 PROCEDURE — 97110 THERAPEUTIC EXERCISES: CPT | Performed by: PHYSICAL THERAPIST

## 2024-01-15 PROCEDURE — 97140 MANUAL THERAPY 1/> REGIONS: CPT | Performed by: PHYSICAL THERAPIST

## 2024-01-17 ENCOUNTER — APPOINTMENT (OUTPATIENT)
Dept: PHYSICAL THERAPY | Age: 44
End: 2024-01-17
Attending: PODIATRIST
Payer: OTHER MISCELLANEOUS

## 2024-01-18 ENCOUNTER — OFFICE VISIT (OUTPATIENT)
Dept: PHYSICAL THERAPY | Age: 44
End: 2024-01-18
Attending: PODIATRIST
Payer: OTHER MISCELLANEOUS

## 2024-01-18 PROCEDURE — 97140 MANUAL THERAPY 1/> REGIONS: CPT

## 2024-01-18 NOTE — PROGRESS NOTES
Diagnosis:   Avulsion fracture (T14.8XXA)  Ankle instability, unspecified laterality (M25.373)          (L)   Referring Provider: Julio  Date of Evaluation:    10/17/23    Precautions:  None Next MD visit:   none scheduled  Date of Surgery: n/a   Insurance Primary/Secondary: WORKERS COMP / N/A     # Auth Visits: 16 visits            Subjective: Patient reports her ankle pain has been the worst it has been the worst    Pain: 6/10 upon arrival, 8/10 w/ weight     Objective:     Observation: (L) ankle pronation. Mild edema over medial/lateral malleoli (Continued)     Palpation: significant TTP of medial malleolus, lateral malleolus, distal gastroc/soleus and posterior tibialis.(Continued)        Observation: pt demonstrates ability to correct ankle pronation with arch lifting during therEx but continues to reports pain         Assessment: Patient presenting w/ heightened pain and difficulty tolerating exercises. She was able to complete mild ankle strengthening, however weight bearing today proving to be very painful.       Goals:   (to be met in 16 visits)  Pt will demonstrate improved DF AROM to >= 10 degrees to promote proper foot clearance during gait and greater ease descending stairs without compensation-No Change in ROM, improved gait mechanics  Pt will have increased ankle strength to 5/5 throughout for improved ankle control with ADLs such as prolonged gait and stair negotiation-Not progressed since PN  Pt will have improved SLS to >10 sec on airex for increased ankle stability with ambulation on uneven surfaces such as gravel and grass-Not tested due to pain in weight bearing  Pt will report <2/10 pain with work activities like squatting, stooping and floor to stand transfers -Pain not reducing   Pt will demonstrate ability to lift and carry 40 lbs x 20 feet to demonstrate readiness to return to full duty at work-Not progressing   Pt will be independent and compliant with comprehensive HEP to maintain  progress achieved in PT - on going     Plan: Progress ankle stability in weight bearing as tolerated, add wobble board balance, standing hip abductons  Date: 12/5/23  Tx# 10/16 Date: 12/27/23  Tx# 11/16 Date: 1/8/24  Tx# 12/16 Date: 1/15/24  Tx# 13/16 Date: 1/18/24  Tx# 14/16   Manual: 25 min   -Taping for longitudinal and metatarsal arch support  -STM to gastroc/soleus and posterior tibialis  Manual: 10 min   -STM to gastroc/soleus and posterior tibialis  -Taping to support medial joint w/ stirups  Manual: 10 min   -STM to gastroc/soleus and posterior tibialis  -kinesio Tapeing to assist in DF Manual: 10 min   -STM to gastroc/soleus and posterior tibialis  -kinesio Tapeing to assist in DF   TherEx: 15 min    (All performed in non-weightbearing)   -toe curls: x30  -towel scrunching: x2 min  -toe yoga: x 5 min   -arch lifting in sitting:  TherEx: 40min  Towel scrunches x 10  Toe yoga x 3 min  Toe yoga w/ arch lift 3 min  Windshield wipers x 20  Heel toes x 20       TherEx: 30 min  -Towel scrunches x 10  -Toe yoga x 3 min  -Toe yoga w/ arch lift 3 min  -Heel toes x 20  -standing arch lift: (taped and brace on) x 10 sec x10  -weight shifts to involved side: x20   TherEx: 30 min  -standing arch lift: (taped) x 10 sec x10  -weight shifts to involved side: x20  -marching: x20  -tandem balance: 2x30 sec each position   -Feet together balance on airex: 2x1 min  -Bridges on YSB: 2x10  -s/l hip abd: 2#, 3x10      Hold for future   -wobble board balance  -standing hip abductions TherEx: 5 min  4 way ankle GTB x 20       Modalities:   Moist heat on (L) ankle x 10 min    Modalities:   Moist heat on (L) ankle x 10 min  Modalities: 15min  Moist heat on (L) ankle x 10 min    HEP:   Access Code: [email or web address]  URL: [email or web address]  Date: 11/16/2023  Prepared by: Bernard Beckman    Exercises  - Seated Ankle Pumps on Table  - 1 x daily - 7 x weekly - 3 sets - 10 reps  - Seated Ankle Inversion Eversion AROM  - 1 x daily - 7 x  weekly - 3 sets - 10 reps  - Seated Ankle Circles  - 1 x daily - 7 x weekly - 3 sets - 10 reps  - Seated Ankle Alphabet  - 1 x daily - 7 x weekly - 3 sets - 10 reps  - Seated Heel Raise  - 1 x daily - 7 x weekly - 3 sets - 10 reps  - Seated Ankle Dorsiflexion AROM  - 1 x daily - 7 x weekly - 3 sets - 10 reps  - Hooklying Clamshell with Resistance  - 2 x daily - 7 x weekly - 2 sets - 15 reps - 3 hold  - Standing Hip Abduction with Resistance at Ankles and Counter Support  - 2 x daily - 7 x weekly - 2 sets - 15 reps - 3 hold  - Seated Ankle Inversion with Resistance and Legs Crossed  - 2 x daily - 7 x weekly - 2 sets - 10 reps - 3 hold  - Seated Ankle Eversion with Resistance  - 2 x daily - 7 x weekly - 2 sets - 10 reps - 3 hold  - Ankle and Toe Plantarflexion with Resistance  - 2 x daily - 7 x weekly - 2 sets - 10 reps - 3 hold  - Seated Ankle Dorsiflexion with Resistance  - 2 x daily - 7 x weekly - 2 sets - 10 reps - 3 hold    Charges: Modalities 1 units, manual tech: 1 unit,      Total Timed Treatment: 30 min Total Treatment Time: 40 min

## 2024-01-22 ENCOUNTER — OFFICE VISIT (OUTPATIENT)
Dept: PHYSICAL THERAPY | Age: 44
End: 2024-01-22
Attending: PODIATRIST
Payer: OTHER MISCELLANEOUS

## 2024-01-22 PROCEDURE — 97110 THERAPEUTIC EXERCISES: CPT

## 2024-01-22 NOTE — PROGRESS NOTES
Discharge Summary  Pt has attended 15 visits in Physical Therapy.     Diagnosis:   Avulsion fracture (T14.8XXA)  Ankle instability, unspecified laterality (M25.373)          (L)   Referring Provider: Julio  Date of Evaluation:    10/17/23    Precautions:  None Next MD visit:   none scheduled  Date of Surgery: n/a   Insurance Primary/Secondary: WORKERS COMP / N/A     # Auth Visits: 16 visits            Subjective: Patient reports her ankle pain is throbbing as well as intermittent burning  Functional improvements: mild improvement in pain during weight bearing  Functional deficits: impaired tolerance for prolonged weight bearing    Pain: 4/10 upon arrival, 6-7/10 w/ weight bearing     Objective:     Observation: BL pronation and pes planus, BL knee genu valgum and genu recurvatum    Palpation: Significant tenderness to palpation of (L) medial malleolus with noted edema, mild tenderness at ATFL, mild tenderness at ankle mortise, diminished pain in posterior tibialis and gastroc/soleus    Sensation: WNL     AROM: (* denotes performed with pain)  Knee Foot/Ankle   Flexion: R WNL; L WNL  Extension: R WNL; L WNL    DF: R 7; L -20* (pt has demonstrated full ROM in the past visits)   PF: R 65; L 65  INV: R 45; L 40*  EV: R 18; L 15*  Great toe ext: R WNL; L WNL      Accessory motion: Hypomobile TC posterior glide, mild pain     Flexibility:  Gastroc-soleus: R 7; L 5*     Strength/MMT: (* denotes performed with pain)  Knee Foot/Ankle   Flexion: R 5/5; L 5/5  Extension: R 5/5; L 5/5    DF: R 5/5; L 5/5  PF: R 4+/5; L 5/5*  INV: R 4+/5; L 4+/5  EV: R 4+/5; L 4+/5  Great toe ext: R 4/5; L 4/5      Special tests:   Anterior drawer test: (+) for pain  Valgus varus stress test: (+) for pain       Gait: pt ambulates on level ground with antalgia, decreased step length L, decreased stance phase L, and trendelenburg/waddle        Balance: Tandem stance; R foot 30 sec, L foot 5 sec *       Assessment: Pt arrived to visit w/  complaints of (L) ankle pain medially and can be shooting and achiness on lateral aspect after a fall at work. Functional limitations included walking independently >10 minutes, standing >20 minutes, step up/down curb, negotiating stairs, walking over uneven surfaces, stepping obstacles, jogging, bracing/blocking a child at work, balancing, walking kids w/ gait belts (squatted walk), raise up on tippy toes to reach to higher cabinets, stooping, squatting, kneeling and floor to stand transfers. Objective measurements found deficits in AROM/PROM, soft tissue restrictions, joint mobility, weight bearing, balance, edema and strength. After 15 visits of skilled PT, patient has seen improvements in ankle strength. However, patient continues to display deficits in ankle ROM, as well as impaired tolerance for weight bearing. Patient has maximized her benefits from PT and has not seen a large improvement functionality to be able to return to full time work duties. Requesting doctor to advise patient on next step to address her current deficits to return to work as she has not demonstrated any significant improvements with PT as an intervention.               Goals:   (to be met in 16 visits)  Pt will demonstrate improved DF AROM to >= 10 degrees to promote proper foot clearance during gait and greater ease descending stairs without compensation-No Change in ROM, improved gait mechanics  Pt will have increased ankle strength to 5/5 throughout for improved ankle control with ADLs such as prolonged gait and stair negotiation-MET  Pt will have improved SLS to >10 sec on airex for increased ankle stability with ambulation on uneven surfaces such as gravel and grass-Not tested due to pain in weight bearing  Pt will report <2/10 pain with work activities like squatting, stooping and floor to stand transfers -Pain not reducing   Pt will demonstrate ability to lift and carry 40 lbs x 20 feet to demonstrate readiness to return to full  duty at work-Not progressing   Pt will be independent and compliant with comprehensive HEP to maintain progress achieved in PT - on going   LEFS Score  LEFS Score: 36.25 % (10/17/2023  2:36 PM)    Post LEFS Score  Post LEFS Score: 32.5 % (1/22/2024  2:40 PM)    -3.75 % improvement    Plan: Doctor please advise on next course of tx. Therapist does not recommend further PT due to lack of progress.     Patient/Family/Caregiver was advised of these findings, precautions, and treatment options and has agreed to actively participate in planning and for this course of care.    Thank you for your referral. If you have any questions, please contact me at Dept: [phone].    Sincerely,  Electronically signed by therapist: Bernard Beckman PTA     Physician's certification required:  No  Please co-sign or sign and return this letter via fax as soon as possible to [phone].   I certify the need for these services furnished under this plan of treatment and while under my care.    X___________________________________________________ Date____________________    Certification From: 1/22/2024  To:4/21/2024   Date: 12/27/23  Tx# 11/16 Date: 1/8/24  Tx# 12/16 Date: 1/15/24  Tx# 13/16 Date: 1/18/24  Tx# 14/16 Date: 1/22/24  Tx 15/16    Manual: 10 min   -STM to gastroc/soleus and posterior tibialis  -Taping to support medial joint w/ stirups  Manual: 10 min   -STM to gastroc/soleus and posterior tibialis  -kinesio Tapeing to assist in DF Manual: 10 min   -STM to gastroc/soleus and posterior tibialis  -kinesio Tapeing to assist in DF    TherEx: 40min  Towel scrunches x 10  Toe yoga x 3 min  Toe yoga w/ arch lift 3 min  Windshield wipers x 20  Heel toes x 20       TherEx: 30 min  -Towel scrunches x 10  -Toe yoga x 3 min  -Toe yoga w/ arch lift 3 min  -Heel toes x 20  -standing arch lift: (taped and brace on) x 10 sec x10  -weight shifts to involved side: x20   TherEx: 30 min  -standing arch lift: (taped) x 10 sec x10  -weight shifts  to involved side: x20  -marching: x20  -tandem balance: 2x30 sec each position   -Feet together balance on airex: 2x1 min  -Bridges on YSB: 2x10  -s/l hip abd: 2#, 3x10      Hold for future   -wobble board balance  -standing hip abductions TherEx: 5 min  4 way ankle GTB x 20     TherEx: 45min  Reassessment  Standing arch lift 10 sec x 10   Tandem balance 2 x 30 sec  Wobble board taps AP x 10       Modalities:   Moist heat on (L) ankle x 10 min  Modalities: 15min  Moist heat on (L) ankle x 10 min     HEP:   Access Code: [email or web address]  URL: [email or web address]  Date: 11/16/2023  Prepared by: Bernard Beckman    Exercises  - Seated Ankle Pumps on Table  - 1 x daily - 7 x weekly - 3 sets - 10 reps  - Seated Ankle Inversion Eversion AROM  - 1 x daily - 7 x weekly - 3 sets - 10 reps  - Seated Ankle Circles  - 1 x daily - 7 x weekly - 3 sets - 10 reps  - Seated Ankle Alphabet  - 1 x daily - 7 x weekly - 3 sets - 10 reps  - Seated Heel Raise  - 1 x daily - 7 x weekly - 3 sets - 10 reps  - Seated Ankle Dorsiflexion AROM  - 1 x daily - 7 x weekly - 3 sets - 10 reps  - Hooklying Clamshell with Resistance  - 2 x daily - 7 x weekly - 2 sets - 15 reps - 3 hold  - Standing Hip Abduction with Resistance at Ankles and Counter Support  - 2 x daily - 7 x weekly - 2 sets - 15 reps - 3 hold  - Seated Ankle Inversion with Resistance and Legs Crossed  - 2 x daily - 7 x weekly - 2 sets - 10 reps - 3 hold  - Seated Ankle Eversion with Resistance  - 2 x daily - 7 x weekly - 2 sets - 10 reps - 3 hold  - Ankle and Toe Plantarflexion with Resistance  - 2 x daily - 7 x weekly - 2 sets - 10 reps - 3 hold  - Seated Ankle Dorsiflexion with Resistance  - 2 x daily - 7 x weekly - 2 sets - 10 reps - 3 hold    Charges: 3 TherEx    Total Timed Treatment: 45 min Total Treatment Time: 45 min

## 2024-10-08 ENCOUNTER — MED REC SCAN ONLY (OUTPATIENT)
Dept: ORTHOPEDICS CLINIC | Facility: CLINIC | Age: 44
End: 2024-10-08

## 2024-10-08 ENCOUNTER — TELEPHONE (OUTPATIENT)
Dept: ORTHOPEDICS CLINIC | Facility: CLINIC | Age: 44
End: 2024-10-08

## 2024-10-08 NOTE — TELEPHONE ENCOUNTER
[employer] faxed over a request for medical records regarding patients workers comp claim pertaining to her disability date of 9/8/23

## (undated) NOTE — LETTER
Date: 10/27/2023    Patient Name: Nina Galan          To Whom it may concern: This letter has been written at the patient's request. The above patient was seen at the [facility] for treatment of a medical condition. This patient should be excused from attending work until the completion of physical therapy in 3-4 weeks. The patient may return to work  with the following limitations limited standing and walking, no running or jogging & frequent rest periods. Continued need to wear CAM boot until weight bearing status improve through working with Physical Therapy. Sincerely,    Prince Patient  Noni Brooks DPM

## (undated) NOTE — LETTER
Date: 10/27/2023    Patient Name: Jayme Charlton          To Whom it may concern: This letter has been written at the patient's request. The above patient was seen at the [facility] for treatment of a medical condition. This patient should be excused from attending work. The patient may return to work  with the following limitations limited standing and walking, no running or jogging & frequent rest periods. Continued need to wear CAM boot until weight bearing status improve through working with Physical Therapy. Sincerely,    Deejay Bagley DPM

## (undated) NOTE — LETTER
Date & Time: 4/20/2023, 10:05 AM  Patient: [address]  Encounter Provider(s):    Ann Marie Pickard MD       To Whom It May Concern:    Liu Rodrigues was seen and treated in our department on 4/20/2023. She should not return to work until 4/20/23 .     If you have any questions or concerns, please do not hesitate to call.        _____________________________  Physician/APC Signature

## (undated) NOTE — LETTER
Date & Time: 4/20/2023, 10:18 AM  Patient: [address]  Encounter Provider(s):    Patti Rdz MD       To Whom It May Concern:    Liu Rodrigues was seen and treated in our department on 4/20/2023. She should not return to work until 4/24/23 .     If you have any questions or concerns, please do not hesitate to call.        _____________________________  Physician/APC Signature

## (undated) NOTE — LETTER
Date: 10/4/2023    Patient Name: Mitali Granados          To Whom it may concern: This letter has been written at the patient's request. The above patient was seen at the [facility] for treatment of a medical condition. This patient should be excused from attending work from until pending MRI completion. Patient is also undergoing therapy as well. Sincerely,    Ajay De Guzman DPM

## (undated) NOTE — ED AVS SNAPSHOT
[facility] in [address] [place] [address] Drive,[address]    [facility]    Phone:  [phone]    Fax:  [address]   MRN: [id number]    Department:  [facility] in [facility]   Date of Visit:  4/ Discharge Instructions       You may take the Zofran 4 mg ODT 1 every 6 hours as needed for nausea or vomiting. You may take Antivert 25 mg 1 tablet every 6 hours as needed for dizziness. Avoid any spicy, greasy, fatty foods.   Eat small frequent meals th primary care or a specialist physician for a follow-up visit, please tell this physician (or your personal doctor if your instructions are to return to your personal doctor) about any new or lasting problems.  The primary care or specialist physician will s - If you are a smoker or have smoked in the last 12 months, we encourage you to explore options for quitting.     - If you have concerns related to behavioral health issues or thoughts of harming yourself, contact [facility] a

## (undated) NOTE — LETTER
Date & Time: 1/19/2023, 4:49 PM  Patient: [address]  Encounter Provider(s):    MARITZA Arora       To Whom It May Concern:    Liu Rodrigues was seen and treated in our department on 1/19/2023. She may return to work 1/23/2023. If you have any questions or concerns, please do not hesitate to call.       MARITZA Mills

## (undated) NOTE — LETTER
Date: 11/16/2023    Patient Name: Neymar Emmanuel          To Whom it may concern: This letter has been written at the patient's request. The above patient was seen at the [facility] for treatment of a medical condition. This patient should be excused from attending work from 11/16/23 until further notice/evaluation. Sincerely,    Angela Houston DPM

## (undated) NOTE — LETTER
Date: 10/27/2023    Patient Name: Duncan Mcdaniel          To Whom it may concern: This letter has been written at the patient's request. The above patient was seen at the [facility] for treatment of a medical condition. This patient should be excused from attending work until the completion of physical therapy in 3-4 weeks. The patient may return to work  with the following limitations limited standing and walking, no running or jogging & frequent rest periods. Sincerely,    Alice Crespo DPM

## (undated) NOTE — LETTER
[facility] [address]ra, [address] [address] [id number]  [facility]: [address] [place] [address] Drive: [phone]      To Whom It May Concern:    Jerod Dash is currently under my medical care and may not return to work at this time. Activity is restricted as follows:     - Patient may return to work after completion of physical therapy in 3-4 weeks. If you require additional information please contact our office. Sincerely,    CASPER Chowdary MA